# Patient Record
Sex: FEMALE | Race: BLACK OR AFRICAN AMERICAN | NOT HISPANIC OR LATINO | URBAN - METROPOLITAN AREA
[De-identification: names, ages, dates, MRNs, and addresses within clinical notes are randomized per-mention and may not be internally consistent; named-entity substitution may affect disease eponyms.]

---

## 2019-03-28 ENCOUNTER — OUTPATIENT (OUTPATIENT)
Dept: OUTPATIENT SERVICES | Facility: HOSPITAL | Age: 34
LOS: 1 days | Discharge: STILL ADMITTED OR ADMINISTRATIVE | End: 2019-03-28

## 2019-03-28 ENCOUNTER — INPATIENT (INPATIENT)
Facility: HOSPITAL | Age: 34
LOS: 7 days | Discharge: HOME | End: 2019-04-05
Attending: PSYCHIATRY & NEUROLOGY | Admitting: PSYCHIATRY & NEUROLOGY
Payer: COMMERCIAL

## 2019-03-28 VITALS — WEIGHT: 117.07 LBS | HEIGHT: 64 IN

## 2019-03-28 DIAGNOSIS — F29 UNSPECIFIED PSYCHOSIS NOT DUE TO A SUBSTANCE OR KNOWN PHYSIOLOGICAL CONDITION: ICD-10-CM

## 2019-03-28 PROCEDURE — 90792 PSYCH DIAG EVAL W/MED SRVCS: CPT

## 2019-03-28 RX ORDER — DIPHENHYDRAMINE HCL 50 MG
25 CAPSULE ORAL ONCE
Qty: 0 | Refills: 0 | Status: DISCONTINUED | OUTPATIENT
Start: 2019-03-28 | End: 2019-04-05

## 2019-03-28 RX ORDER — DIPHENHYDRAMINE HCL 50 MG
25 CAPSULE ORAL EVERY 6 HOURS
Qty: 0 | Refills: 0 | Status: DISCONTINUED | OUTPATIENT
Start: 2019-03-28 | End: 2019-04-05

## 2019-03-28 RX ORDER — HALOPERIDOL DECANOATE 100 MG/ML
5 INJECTION INTRAMUSCULAR ONCE
Qty: 0 | Refills: 0 | Status: DISCONTINUED | OUTPATIENT
Start: 2019-03-28 | End: 2019-04-05

## 2019-03-28 RX ORDER — HALOPERIDOL DECANOATE 100 MG/ML
5 INJECTION INTRAMUSCULAR EVERY 6 HOURS
Qty: 0 | Refills: 0 | Status: DISCONTINUED | OUTPATIENT
Start: 2019-03-28 | End: 2019-04-05

## 2019-03-28 RX ORDER — RISPERIDONE 4 MG/1
1 TABLET ORAL AT BEDTIME
Qty: 0 | Refills: 0 | Status: DISCONTINUED | OUTPATIENT
Start: 2019-03-28 | End: 2019-03-29

## 2019-03-28 RX ADMIN — RISPERIDONE 1 MILLIGRAM(S): 4 TABLET ORAL at 20:38

## 2019-03-28 NOTE — CHART NOTE - NSCHARTNOTEFT_GEN_A_CORE
Screening Medical Evaluation  Patient Admitted from: Crisis    [facility] admitting diagnosis: Non-organic psychosis    PAST MEDICAL & SURGICAL HISTORY:        Allergies    No Known Allergies    Intolerances        Social History:     FAMILY HISTORY:      MEDICATIONS  (STANDING):  risperiDONE   Tablet 1 milliGRAM(s) Oral at bedtime    MEDICATIONS  (PRN):  diphenhydrAMINE 25 milliGRAM(s) Oral every 6 hours PRN eps/psychotic agitation  diphenhydrAMINE   Injectable 25 milliGRAM(s) IntraMuscular once PRN eps/psychotic agitation  haloperidol     Tablet 5 milliGRAM(s) Oral every 6 hours PRN psychotic agitation  haloperidol    Injectable 5 milliGRAM(s) IntraMuscular once PRN psychotic agitation  LORazepam     Tablet 1 milliGRAM(s) Oral every 6 hours PRN anxiety/psychotic agitation  LORazepam   Injectable 1 milliGRAM(s) IntraMuscular once PRN psychotic agitation      Vital Signs Last 24 Hrs  T(C): --  T(F): --  HR: --91  BP: --141/89  BP(mean): --  RR: --  SpO2: --  CAPILLARY BLOOD GLUCOSE            PHYSICAL EXAM:  GENERAL: NAD, well-developed  HEAD:  Atraumatic, Normocephalic  EYES: EOMI, PERRLA, conjunctiva and sclera clear  NECK: Supple, No JVD  CHEST/LUNG: Clear to auscultation bilaterally; No wheeze  HEART: Regular rate and rhythm; No murmurs, rubs, or gallops  ABDOMEN: Soft, Nontender, Nondistended; Bowel sounds present  EXTREMITIES:  2+ Peripheral Pulses, No clubbing, cyanosis, or edema  PSYCH: AAOx3  NEUROLOGY: non-focal  SKIN: In tact    LABS:                    RADIOLOGY & ADDITIONAL TESTS:    Assessment and Plan: 33 yo F with no significant PMH is admitted to [facility] with a primary psychiatric diagnosis of Non-organic psychosis. The pt currently denies having any medical complaints such as chest pain, sob, abdominal pain, n/v/d/c, or any problems with urination or bowel movements. The rest of her screening physical is unremarkable.    1.Non-organic psychosis-Plan: continue with meds as per primary psychiatric team   2. Nutritional consult-Pt reports being unable to swallow certain pills, but she is unable to specify any further. Nutritional eval pending.

## 2019-03-29 DIAGNOSIS — F29 UNSPECIFIED PSYCHOSIS NOT DUE TO A SUBSTANCE OR KNOWN PHYSIOLOGICAL CONDITION: ICD-10-CM

## 2019-03-29 PROCEDURE — 99232 SBSQ HOSP IP/OBS MODERATE 35: CPT | Mod: GC

## 2019-03-29 RX ORDER — RISPERIDONE 4 MG/1
3 TABLET ORAL AT BEDTIME
Qty: 0 | Refills: 0 | Status: DISCONTINUED | OUTPATIENT
Start: 2019-03-30 | End: 2019-03-30

## 2019-03-29 RX ORDER — RISPERIDONE 4 MG/1
2 TABLET ORAL AT BEDTIME
Qty: 0 | Refills: 0 | Status: COMPLETED | OUTPATIENT
Start: 2019-03-29 | End: 2019-03-29

## 2019-03-29 RX ADMIN — Medication 1 MILLIGRAM(S): at 10:10

## 2019-03-29 RX ADMIN — Medication 1 MILLIGRAM(S): at 03:57

## 2019-03-30 PROCEDURE — 99232 SBSQ HOSP IP/OBS MODERATE 35: CPT

## 2019-03-30 RX ORDER — RISPERIDONE 4 MG/1
3 TABLET ORAL AT BEDTIME
Qty: 0 | Refills: 0 | Status: DISCONTINUED | OUTPATIENT
Start: 2019-03-30 | End: 2019-03-30

## 2019-03-30 RX ORDER — RISPERIDONE 4 MG/1
2 TABLET ORAL AT BEDTIME
Qty: 0 | Refills: 0 | Status: DISCONTINUED | OUTPATIENT
Start: 2019-03-30 | End: 2019-04-01

## 2019-03-30 RX ADMIN — Medication 1 MILLIGRAM(S): at 19:35

## 2019-03-30 RX ADMIN — Medication 1 MILLIGRAM(S): at 12:26

## 2019-03-30 RX ADMIN — Medication 25 MILLIGRAM(S): at 19:35

## 2019-03-30 RX ADMIN — HALOPERIDOL DECANOATE 5 MILLIGRAM(S): 100 INJECTION INTRAMUSCULAR at 19:35

## 2019-03-30 RX ADMIN — Medication 1 MILLIGRAM(S): at 08:40

## 2019-03-31 PROCEDURE — 99232 SBSQ HOSP IP/OBS MODERATE 35: CPT

## 2019-03-31 RX ADMIN — Medication 1 MILLIGRAM(S): at 09:00

## 2019-03-31 RX ADMIN — Medication 1 MILLIGRAM(S): at 13:12

## 2019-03-31 RX ADMIN — Medication 1 MILLIGRAM(S): at 21:15

## 2019-03-31 RX ADMIN — RISPERIDONE 2 MILLIGRAM(S): 4 TABLET ORAL at 21:15

## 2019-04-01 LAB
ALBUMIN SERPL ELPH-MCNC: 4.5 G/DL — SIGNIFICANT CHANGE UP (ref 3.3–5)
ALP SERPL-CCNC: 53 U/L — SIGNIFICANT CHANGE UP (ref 40–120)
ALT FLD-CCNC: 15 U/L — SIGNIFICANT CHANGE UP (ref 4–33)
ANION GAP SERPL CALC-SCNC: 11 MMO/L — SIGNIFICANT CHANGE UP (ref 7–14)
APAP SERPL-MCNC: < 15 UG/ML — LOW (ref 15–25)
APPEARANCE UR: CLEAR — SIGNIFICANT CHANGE UP
AST SERPL-CCNC: 19 U/L — SIGNIFICANT CHANGE UP (ref 4–32)
BACTERIA # UR AUTO: SIGNIFICANT CHANGE UP
BASOPHILS # BLD AUTO: 0.03 K/UL — SIGNIFICANT CHANGE UP (ref 0–0.2)
BASOPHILS NFR BLD AUTO: 0.5 % — SIGNIFICANT CHANGE UP (ref 0–2)
BILIRUB SERPL-MCNC: 0.7 MG/DL — SIGNIFICANT CHANGE UP (ref 0.2–1.2)
BILIRUB UR-MCNC: NEGATIVE — SIGNIFICANT CHANGE UP
BLOOD UR QL VISUAL: NEGATIVE — SIGNIFICANT CHANGE UP
BUN SERPL-MCNC: 11 MG/DL — SIGNIFICANT CHANGE UP (ref 7–23)
CALCIUM SERPL-MCNC: 9.6 MG/DL — SIGNIFICANT CHANGE UP (ref 8.4–10.5)
CHLORIDE SERPL-SCNC: 105 MMOL/L — SIGNIFICANT CHANGE UP (ref 98–107)
CHOLEST SERPL-MCNC: 145 MG/DL — SIGNIFICANT CHANGE UP (ref 120–199)
CO2 SERPL-SCNC: 24 MMOL/L — SIGNIFICANT CHANGE UP (ref 22–31)
COLOR SPEC: YELLOW — SIGNIFICANT CHANGE UP
CREAT SERPL-MCNC: 0.73 MG/DL — SIGNIFICANT CHANGE UP (ref 0.5–1.3)
EOSINOPHIL # BLD AUTO: 0.09 K/UL — SIGNIFICANT CHANGE UP (ref 0–0.5)
EOSINOPHIL NFR BLD AUTO: 1.6 % — SIGNIFICANT CHANGE UP (ref 0–6)
ETHANOL BLD-MCNC: < 10 MG/DL — SIGNIFICANT CHANGE UP
GLUCOSE SERPL-MCNC: 89 MG/DL — SIGNIFICANT CHANGE UP (ref 70–99)
GLUCOSE UR-MCNC: NEGATIVE — SIGNIFICANT CHANGE UP
HBA1C BLD-MCNC: 4.6 % — SIGNIFICANT CHANGE UP (ref 4–5.6)
HCG UR-SCNC: NEGATIVE — SIGNIFICANT CHANGE UP
HCT VFR BLD CALC: 38.3 % — SIGNIFICANT CHANGE UP (ref 34.5–45)
HDLC SERPL-MCNC: 79 MG/DL — HIGH (ref 45–65)
HGB BLD-MCNC: 13 G/DL — SIGNIFICANT CHANGE UP (ref 11.5–15.5)
HYALINE CASTS # UR AUTO: SIGNIFICANT CHANGE UP
IMM GRANULOCYTES NFR BLD AUTO: 0.2 % — SIGNIFICANT CHANGE UP (ref 0–1.5)
KETONES UR-MCNC: NEGATIVE — SIGNIFICANT CHANGE UP
LEUKOCYTE ESTERASE UR-ACNC: NEGATIVE — SIGNIFICANT CHANGE UP
LIPID PNL WITH DIRECT LDL SERPL: 67 MG/DL — SIGNIFICANT CHANGE UP
LYMPHOCYTES # BLD AUTO: 1.9 K/UL — SIGNIFICANT CHANGE UP (ref 1–3.3)
LYMPHOCYTES # BLD AUTO: 33.2 % — SIGNIFICANT CHANGE UP (ref 13–44)
MCHC RBC-ENTMCNC: 28.4 PG — SIGNIFICANT CHANGE UP (ref 27–34)
MCHC RBC-ENTMCNC: 33.9 % — SIGNIFICANT CHANGE UP (ref 32–36)
MCV RBC AUTO: 83.8 FL — SIGNIFICANT CHANGE UP (ref 80–100)
MONOCYTES # BLD AUTO: 0.48 K/UL — SIGNIFICANT CHANGE UP (ref 0–0.9)
MONOCYTES NFR BLD AUTO: 8.4 % — SIGNIFICANT CHANGE UP (ref 2–14)
NEUTROPHILS # BLD AUTO: 3.21 K/UL — SIGNIFICANT CHANGE UP (ref 1.8–7.4)
NEUTROPHILS NFR BLD AUTO: 56.1 % — SIGNIFICANT CHANGE UP (ref 43–77)
NITRITE UR-MCNC: NEGATIVE — SIGNIFICANT CHANGE UP
NRBC # FLD: 0 K/UL — SIGNIFICANT CHANGE UP (ref 0–0)
PH UR: 6.5 — SIGNIFICANT CHANGE UP (ref 5–8)
PLATELET # BLD AUTO: 302 K/UL — SIGNIFICANT CHANGE UP (ref 150–400)
PMV BLD: 11 FL — SIGNIFICANT CHANGE UP (ref 7–13)
POTASSIUM SERPL-MCNC: 3.8 MMOL/L — SIGNIFICANT CHANGE UP (ref 3.5–5.3)
POTASSIUM SERPL-SCNC: 3.8 MMOL/L — SIGNIFICANT CHANGE UP (ref 3.5–5.3)
PROT SERPL-MCNC: 7.6 G/DL — SIGNIFICANT CHANGE UP (ref 6–8.3)
PROT UR-MCNC: 20 — SIGNIFICANT CHANGE UP
RBC # BLD: 4.57 M/UL — SIGNIFICANT CHANGE UP (ref 3.8–5.2)
RBC # FLD: 12.8 % — SIGNIFICANT CHANGE UP (ref 10.3–14.5)
RBC CASTS # UR COMP ASSIST: SIGNIFICANT CHANGE UP (ref 0–?)
SALICYLATES SERPL-MCNC: < 5 MG/DL — LOW (ref 15–30)
SODIUM SERPL-SCNC: 140 MMOL/L — SIGNIFICANT CHANGE UP (ref 135–145)
SP GR SPEC: 1.03 — SIGNIFICANT CHANGE UP (ref 1–1.04)
SP GR UR: 1.04 — HIGH (ref 1–1.03)
SQUAMOUS # UR AUTO: SIGNIFICANT CHANGE UP
TRIGL SERPL-MCNC: 41 MG/DL — SIGNIFICANT CHANGE UP (ref 10–149)
TSH SERPL-MCNC: 1.29 UIU/ML — SIGNIFICANT CHANGE UP (ref 0.27–4.2)
UROBILINOGEN FLD QL: SIGNIFICANT CHANGE UP
WBC # BLD: 5.72 K/UL — SIGNIFICANT CHANGE UP (ref 3.8–10.5)
WBC # FLD AUTO: 5.72 K/UL — SIGNIFICANT CHANGE UP (ref 3.8–10.5)
WBC UR QL: HIGH (ref 0–?)

## 2019-04-01 PROCEDURE — 99232 SBSQ HOSP IP/OBS MODERATE 35: CPT | Mod: GC

## 2019-04-01 RX ORDER — RISPERIDONE 4 MG/1
3 TABLET ORAL AT BEDTIME
Qty: 0 | Refills: 0 | Status: DISCONTINUED | OUTPATIENT
Start: 2019-04-01 | End: 2019-04-04

## 2019-04-01 RX ADMIN — Medication 1 MILLIGRAM(S): at 21:25

## 2019-04-01 RX ADMIN — Medication 1 MILLIGRAM(S): at 13:13

## 2019-04-01 RX ADMIN — Medication 1 MILLIGRAM(S): at 08:47

## 2019-04-01 RX ADMIN — RISPERIDONE 3 MILLIGRAM(S): 4 TABLET ORAL at 21:25

## 2019-04-02 PROCEDURE — 99232 SBSQ HOSP IP/OBS MODERATE 35: CPT | Mod: GC

## 2019-04-02 RX ADMIN — Medication 1 MILLIGRAM(S): at 22:07

## 2019-04-02 RX ADMIN — Medication 1 MILLIGRAM(S): at 13:13

## 2019-04-02 RX ADMIN — RISPERIDONE 3 MILLIGRAM(S): 4 TABLET ORAL at 22:06

## 2019-04-02 RX ADMIN — Medication 1 MILLIGRAM(S): at 08:45

## 2019-04-03 PROCEDURE — 99232 SBSQ HOSP IP/OBS MODERATE 35: CPT

## 2019-04-03 RX ADMIN — RISPERIDONE 3 MILLIGRAM(S): 4 TABLET ORAL at 21:29

## 2019-04-03 RX ADMIN — Medication 1 MILLIGRAM(S): at 21:29

## 2019-04-03 RX ADMIN — Medication 1 MILLIGRAM(S): at 13:30

## 2019-04-03 RX ADMIN — Medication 1 MILLIGRAM(S): at 08:53

## 2019-04-04 RX ORDER — RISPERIDONE 4 MG/1
4 TABLET ORAL AT BEDTIME
Qty: 0 | Refills: 0 | Status: DISCONTINUED | OUTPATIENT
Start: 2019-04-04 | End: 2019-04-05

## 2019-04-04 RX ADMIN — Medication 1 MILLIGRAM(S): at 09:50

## 2019-04-04 RX ADMIN — Medication 1 MILLIGRAM(S): at 21:38

## 2019-04-04 RX ADMIN — Medication 1 MILLIGRAM(S): at 12:52

## 2019-04-04 RX ADMIN — RISPERIDONE 4 MILLIGRAM(S): 4 TABLET ORAL at 21:38

## 2019-04-05 VITALS — DIASTOLIC BLOOD PRESSURE: 65 MMHG | SYSTOLIC BLOOD PRESSURE: 100 MMHG | RESPIRATION RATE: 17 BRPM | HEART RATE: 94 BPM

## 2019-04-05 PROCEDURE — 99232 SBSQ HOSP IP/OBS MODERATE 35: CPT | Mod: GC

## 2019-04-05 RX ORDER — RISPERIDONE 4 MG/1
1 TABLET ORAL
Qty: 14 | Refills: 0
Start: 2019-04-05 | End: 2019-04-18

## 2019-04-05 RX ADMIN — Medication 1 MILLIGRAM(S): at 09:45

## 2019-04-05 RX ADMIN — Medication 1 MILLIGRAM(S): at 14:14

## 2019-04-08 ENCOUNTER — OUTPATIENT (OUTPATIENT)
Dept: OUTPATIENT SERVICES | Facility: HOSPITAL | Age: 34
LOS: 1 days | Discharge: HOME | End: 2019-04-08

## 2019-04-09 DIAGNOSIS — F29 UNSPECIFIED PSYCHOSIS NOT DUE TO A SUBSTANCE OR KNOWN PHYSIOLOGICAL CONDITION: ICD-10-CM

## 2019-05-01 LAB
ALBUMIN SERPL ELPH-MCNC: 4 G/DL — SIGNIFICANT CHANGE UP (ref 3.3–5)
ALP SERPL-CCNC: 57 U/L — SIGNIFICANT CHANGE UP (ref 40–120)
ALT FLD-CCNC: 18 U/L — SIGNIFICANT CHANGE UP (ref 4–33)
ANION GAP SERPL CALC-SCNC: 12 MMO/L — SIGNIFICANT CHANGE UP (ref 7–14)
AST SERPL-CCNC: 19 U/L — SIGNIFICANT CHANGE UP (ref 4–32)
BASOPHILS # BLD AUTO: 0.02 K/UL — SIGNIFICANT CHANGE UP (ref 0–0.2)
BASOPHILS NFR BLD AUTO: 0.4 % — SIGNIFICANT CHANGE UP (ref 0–2)
BILIRUB SERPL-MCNC: 0.3 MG/DL — SIGNIFICANT CHANGE UP (ref 0.2–1.2)
BUN SERPL-MCNC: 7 MG/DL — SIGNIFICANT CHANGE UP (ref 7–23)
CALCIUM SERPL-MCNC: 9.2 MG/DL — SIGNIFICANT CHANGE UP (ref 8.4–10.5)
CHLORIDE SERPL-SCNC: 99 MMOL/L — SIGNIFICANT CHANGE UP (ref 98–107)
CO2 SERPL-SCNC: 26 MMOL/L — SIGNIFICANT CHANGE UP (ref 22–31)
CREAT SERPL-MCNC: 0.64 MG/DL — SIGNIFICANT CHANGE UP (ref 0.5–1.3)
EOSINOPHIL # BLD AUTO: 0.12 K/UL — SIGNIFICANT CHANGE UP (ref 0–0.5)
EOSINOPHIL NFR BLD AUTO: 2.2 % — SIGNIFICANT CHANGE UP (ref 0–6)
GLUCOSE SERPL-MCNC: 73 MG/DL — SIGNIFICANT CHANGE UP (ref 70–99)
HCT VFR BLD CALC: 34.5 % — SIGNIFICANT CHANGE UP (ref 34.5–45)
HGB BLD-MCNC: 11.6 G/DL — SIGNIFICANT CHANGE UP (ref 11.5–15.5)
IMM GRANULOCYTES NFR BLD AUTO: 0.2 % — SIGNIFICANT CHANGE UP (ref 0–1.5)
LYMPHOCYTES # BLD AUTO: 1.56 K/UL — SIGNIFICANT CHANGE UP (ref 1–3.3)
LYMPHOCYTES # BLD AUTO: 28.8 % — SIGNIFICANT CHANGE UP (ref 13–44)
MCHC RBC-ENTMCNC: 28.4 PG — SIGNIFICANT CHANGE UP (ref 27–34)
MCHC RBC-ENTMCNC: 33.6 % — SIGNIFICANT CHANGE UP (ref 32–36)
MCV RBC AUTO: 84.6 FL — SIGNIFICANT CHANGE UP (ref 80–100)
MONOCYTES # BLD AUTO: 0.5 K/UL — SIGNIFICANT CHANGE UP (ref 0–0.9)
MONOCYTES NFR BLD AUTO: 9.2 % — SIGNIFICANT CHANGE UP (ref 2–14)
NEUTROPHILS # BLD AUTO: 3.21 K/UL — SIGNIFICANT CHANGE UP (ref 1.8–7.4)
NEUTROPHILS NFR BLD AUTO: 59.2 % — SIGNIFICANT CHANGE UP (ref 43–77)
NRBC # FLD: 0.06 K/UL — SIGNIFICANT CHANGE UP (ref 0–0)
NRBC FLD-RTO: 1.1 — SIGNIFICANT CHANGE UP
PLATELET # BLD AUTO: 254 K/UL — SIGNIFICANT CHANGE UP (ref 150–400)
PMV BLD: 11.1 FL — SIGNIFICANT CHANGE UP (ref 7–13)
POTASSIUM SERPL-MCNC: 3.8 MMOL/L — SIGNIFICANT CHANGE UP (ref 3.5–5.3)
POTASSIUM SERPL-SCNC: 3.8 MMOL/L — SIGNIFICANT CHANGE UP (ref 3.5–5.3)
PROT SERPL-MCNC: 6.8 G/DL — SIGNIFICANT CHANGE UP (ref 6–8.3)
RBC # BLD: 4.08 M/UL — SIGNIFICANT CHANGE UP (ref 3.8–5.2)
RBC # FLD: 12.8 % — SIGNIFICANT CHANGE UP (ref 10.3–14.5)
SODIUM SERPL-SCNC: 137 MMOL/L — SIGNIFICANT CHANGE UP (ref 135–145)
WBC # BLD: 5.42 K/UL — SIGNIFICANT CHANGE UP (ref 3.8–10.5)
WBC # FLD AUTO: 5.42 K/UL — SIGNIFICANT CHANGE UP (ref 3.8–10.5)

## 2019-05-02 LAB — PROLACTIN SERPL-MCNC: 338 NG/ML — HIGH (ref 3.4–24.1)

## 2019-05-17 LAB
ALBUMIN SERPL ELPH-MCNC: 4.5 G/DL — SIGNIFICANT CHANGE UP (ref 3.3–5)
ALP SERPL-CCNC: 66 U/L — SIGNIFICANT CHANGE UP (ref 40–120)
ALT FLD-CCNC: 19 U/L — SIGNIFICANT CHANGE UP (ref 4–33)
ANION GAP SERPL CALC-SCNC: 12 MMO/L — SIGNIFICANT CHANGE UP (ref 7–14)
AST SERPL-CCNC: 20 U/L — SIGNIFICANT CHANGE UP (ref 4–32)
BASOPHILS # BLD AUTO: 0.01 K/UL — SIGNIFICANT CHANGE UP (ref 0–0.2)
BASOPHILS NFR BLD AUTO: 0.1 % — SIGNIFICANT CHANGE UP (ref 0–2)
BILIRUB SERPL-MCNC: 0.3 MG/DL — SIGNIFICANT CHANGE UP (ref 0.2–1.2)
BUN SERPL-MCNC: 10 MG/DL — SIGNIFICANT CHANGE UP (ref 7–23)
CALCIUM SERPL-MCNC: 9.5 MG/DL — SIGNIFICANT CHANGE UP (ref 8.4–10.5)
CHLORIDE SERPL-SCNC: 108 MMOL/L — HIGH (ref 98–107)
CO2 SERPL-SCNC: 25 MMOL/L — SIGNIFICANT CHANGE UP (ref 22–31)
CREAT SERPL-MCNC: 0.67 MG/DL — SIGNIFICANT CHANGE UP (ref 0.5–1.3)
EOSINOPHIL # BLD AUTO: 0.15 K/UL — SIGNIFICANT CHANGE UP (ref 0–0.5)
EOSINOPHIL NFR BLD AUTO: 1.7 % — SIGNIFICANT CHANGE UP (ref 0–6)
GLUCOSE SERPL-MCNC: 55 MG/DL — LOW (ref 70–99)
HCT VFR BLD CALC: 34.3 % — LOW (ref 34.5–45)
HGB BLD-MCNC: 11.7 G/DL — SIGNIFICANT CHANGE UP (ref 11.5–15.5)
IMM GRANULOCYTES NFR BLD AUTO: 0.2 % — SIGNIFICANT CHANGE UP (ref 0–1.5)
LYMPHOCYTES # BLD AUTO: 1.9 K/UL — SIGNIFICANT CHANGE UP (ref 1–3.3)
LYMPHOCYTES # BLD AUTO: 22 % — SIGNIFICANT CHANGE UP (ref 13–44)
MCHC RBC-ENTMCNC: 28.6 PG — SIGNIFICANT CHANGE UP (ref 27–34)
MCHC RBC-ENTMCNC: 34.1 % — SIGNIFICANT CHANGE UP (ref 32–36)
MCV RBC AUTO: 83.9 FL — SIGNIFICANT CHANGE UP (ref 80–100)
MONOCYTES # BLD AUTO: 0.57 K/UL — SIGNIFICANT CHANGE UP (ref 0–0.9)
MONOCYTES NFR BLD AUTO: 6.6 % — SIGNIFICANT CHANGE UP (ref 2–14)
NEUTROPHILS # BLD AUTO: 6 K/UL — SIGNIFICANT CHANGE UP (ref 1.8–7.4)
NEUTROPHILS NFR BLD AUTO: 69.4 % — SIGNIFICANT CHANGE UP (ref 43–77)
NRBC # FLD: 0 K/UL — SIGNIFICANT CHANGE UP (ref 0–0)
PLATELET # BLD AUTO: 279 K/UL — SIGNIFICANT CHANGE UP (ref 150–400)
PMV BLD: 10.7 FL — SIGNIFICANT CHANGE UP (ref 7–13)
POTASSIUM SERPL-MCNC: 3.8 MMOL/L — SIGNIFICANT CHANGE UP (ref 3.5–5.3)
POTASSIUM SERPL-SCNC: 3.8 MMOL/L — SIGNIFICANT CHANGE UP (ref 3.5–5.3)
PROLACTIN SERPL-MCNC: 57.9 NG/ML — HIGH (ref 3.4–24.1)
PROT SERPL-MCNC: 7.5 G/DL — SIGNIFICANT CHANGE UP (ref 6–8.3)
RBC # BLD: 4.09 M/UL — SIGNIFICANT CHANGE UP (ref 3.8–5.2)
RBC # FLD: 12.9 % — SIGNIFICANT CHANGE UP (ref 10.3–14.5)
SODIUM SERPL-SCNC: 145 MMOL/L — SIGNIFICANT CHANGE UP (ref 135–145)
WBC # BLD: 8.65 K/UL — SIGNIFICANT CHANGE UP (ref 3.8–10.5)
WBC # FLD AUTO: 8.65 K/UL — SIGNIFICANT CHANGE UP (ref 3.8–10.5)

## 2019-06-08 ENCOUNTER — EMERGENCY (EMERGENCY)
Facility: HOSPITAL | Age: 34
LOS: 1 days | Discharge: HOME | End: 2019-06-08
Attending: EMERGENCY MEDICINE | Admitting: EMERGENCY MEDICINE
Payer: COMMERCIAL

## 2019-06-08 VITALS
TEMPERATURE: 99 F | HEART RATE: 76 BPM | RESPIRATION RATE: 16 BRPM | OXYGEN SATURATION: 100 % | DIASTOLIC BLOOD PRESSURE: 67 MMHG | SYSTOLIC BLOOD PRESSURE: 113 MMHG

## 2019-06-08 PROCEDURE — 99281 EMR DPT VST MAYX REQ PHY/QHP: CPT

## 2019-06-08 RX ORDER — SERTRALINE 25 MG/1
1 TABLET, FILM COATED ORAL
Qty: 3 | Refills: 0
Start: 2019-06-08 | End: 2019-06-10

## 2019-06-08 NOTE — ED PROVIDER NOTE - OBJECTIVE STATEMENT
Cabot: 34F with PMH of depression and anxiety comes in for a refill of her psych medication, sertraline, to get her through the weekend until she can speak with her doctor.  No SI, HI, hallucinations.  She provided the bottle for confirmation of dose.

## 2019-06-08 NOTE — ED PROVIDER NOTE - CLINICAL SUMMARY MEDICAL DECISION MAKING FREE TEXT BOX
Cabot: 34F with PMH of depression and anxiety comes in for a refill of her psych medication, sertraline, to get her through the weekend until she can speak with her doctor.  No SI, HI, hallucinations.  She provided the bottle for confirmation of dose. Normal exam.  Ordered medication for the weekend and Monday.

## 2019-06-08 NOTE — ED PROVIDER NOTE - PHYSICAL EXAMINATION
HDS, NAD, MMM, eyes clear, lungs CTAB, heart sounds normal, LEs without edema, wwp, skin normal temperature and color, neuro: alert and oriented, no focal deficits

## 2019-06-19 ENCOUNTER — OUTPATIENT (OUTPATIENT)
Dept: OUTPATIENT SERVICES | Facility: HOSPITAL | Age: 34
LOS: 1 days | Discharge: HOME | End: 2019-06-19

## 2019-06-19 ENCOUNTER — RESULT REVIEW (OUTPATIENT)
Age: 34
End: 2019-06-19

## 2019-07-05 DIAGNOSIS — F20.9 SCHIZOPHRENIA, UNSPECIFIED: ICD-10-CM

## 2019-12-23 ENCOUNTER — APPOINTMENT (OUTPATIENT)
Dept: ANTEPARTUM | Facility: CLINIC | Age: 34
End: 2019-12-23

## 2019-12-27 ENCOUNTER — ASOB RESULT (OUTPATIENT)
Age: 34
End: 2019-12-27

## 2019-12-27 ENCOUNTER — APPOINTMENT (OUTPATIENT)
Dept: ANTEPARTUM | Facility: CLINIC | Age: 34
End: 2019-12-27
Payer: COMMERCIAL

## 2019-12-27 PROCEDURE — 76813 OB US NUCHAL MEAS 1 GEST: CPT

## 2019-12-27 PROCEDURE — 36416 COLLJ CAPILLARY BLOOD SPEC: CPT

## 2019-12-27 PROCEDURE — 76801 OB US < 14 WKS SINGLE FETUS: CPT

## 2020-01-02 ENCOUNTER — LABORATORY RESULT (OUTPATIENT)
Age: 35
End: 2020-01-02

## 2020-01-02 ENCOUNTER — APPOINTMENT (OUTPATIENT)
Dept: PEDIATRIC MEDICAL GENETICS | Facility: CLINIC | Age: 35
End: 2020-01-02
Payer: COMMERCIAL

## 2020-01-02 PROCEDURE — 99202 OFFICE O/P NEW SF 15 MIN: CPT

## 2020-01-15 NOTE — ADDENDUM
[FreeTextEntry1] : January 10, 2020  NIPS (Verifi) was reported as negative for Down syndrome, Trisomy 18/13, and five deletion syndromes.  Patient will be notified. and results will be faxed to your office.

## 2020-02-14 ENCOUNTER — ASOB RESULT (OUTPATIENT)
Age: 35
End: 2020-02-14

## 2020-02-14 ENCOUNTER — APPOINTMENT (OUTPATIENT)
Dept: ANTEPARTUM | Facility: CLINIC | Age: 35
End: 2020-02-14
Payer: COMMERCIAL

## 2020-02-14 PROCEDURE — 76817 TRANSVAGINAL US OBSTETRIC: CPT

## 2020-02-14 PROCEDURE — 76811 OB US DETAILED SNGL FETUS: CPT

## 2020-03-11 ENCOUNTER — OUTPATIENT (OUTPATIENT)
Dept: INPATIENT UNIT | Facility: HOSPITAL | Age: 35
LOS: 1 days | Discharge: HOME | End: 2020-03-11
Payer: COMMERCIAL

## 2020-03-11 VITALS
RESPIRATION RATE: 17 BRPM | HEART RATE: 76 BPM | TEMPERATURE: 98 F | DIASTOLIC BLOOD PRESSURE: 63 MMHG | SYSTOLIC BLOOD PRESSURE: 111 MMHG

## 2020-03-11 VITALS — SYSTOLIC BLOOD PRESSURE: 109 MMHG | DIASTOLIC BLOOD PRESSURE: 68 MMHG | HEART RATE: 71 BPM

## 2020-03-11 DIAGNOSIS — Z3A.00 WEEKS OF GESTATION OF PREGNANCY NOT SPECIFIED: ICD-10-CM

## 2020-03-11 DIAGNOSIS — O26.899 OTHER SPECIFIED PREGNANCY RELATED CONDITIONS, UNSPECIFIED TRIMESTER: ICD-10-CM

## 2020-03-11 LAB
APPEARANCE UR: CLEAR — SIGNIFICANT CHANGE UP
BACTERIA # UR AUTO: HIGH
BILIRUB UR-MCNC: NEGATIVE — SIGNIFICANT CHANGE UP
BLOOD UR QL VISUAL: NEGATIVE — SIGNIFICANT CHANGE UP
COD CRY URNS QL: SIGNIFICANT CHANGE UP (ref 0–0)
COLOR SPEC: YELLOW — SIGNIFICANT CHANGE UP
GLUCOSE UR-MCNC: NEGATIVE — SIGNIFICANT CHANGE UP
HYALINE CASTS # UR AUTO: SIGNIFICANT CHANGE UP
KETONES UR-MCNC: NEGATIVE — SIGNIFICANT CHANGE UP
LEUKOCYTE ESTERASE UR-ACNC: NEGATIVE — SIGNIFICANT CHANGE UP
MUCOUS THREADS # UR AUTO: SIGNIFICANT CHANGE UP
NITRITE UR-MCNC: NEGATIVE — SIGNIFICANT CHANGE UP
PH UR: 6.5 — SIGNIFICANT CHANGE UP (ref 5–8)
PROT UR-MCNC: 30 — SIGNIFICANT CHANGE UP
RBC CASTS # UR COMP ASSIST: SIGNIFICANT CHANGE UP (ref 0–?)
SP GR SPEC: 1.03 — SIGNIFICANT CHANGE UP (ref 1–1.04)
SQUAMOUS # UR AUTO: SIGNIFICANT CHANGE UP
UROBILINOGEN FLD QL: SIGNIFICANT CHANGE UP
WBC UR QL: HIGH (ref 0–?)

## 2020-03-11 PROCEDURE — 76830 TRANSVAGINAL US NON-OB: CPT | Mod: 26

## 2020-03-11 PROCEDURE — 59025 FETAL NON-STRESS TEST: CPT | Mod: 26

## 2020-03-11 PROCEDURE — 99213 OFFICE O/P EST LOW 20 MIN: CPT | Mod: 25

## 2020-03-11 RX ORDER — SERTRALINE 25 MG/1
1 TABLET, FILM COATED ORAL
Qty: 0 | Refills: 0 | DISCHARGE

## 2020-03-11 RX ORDER — OLANZAPINE 15 MG/1
1 TABLET, FILM COATED ORAL
Qty: 0 | Refills: 0 | DISCHARGE

## 2020-03-11 NOTE — OB PROVIDER TRIAGE NOTE - HISTORY OF PRESENT ILLNESS
34 yo Black female @ 23.6 wks GA   presents after Routine prenatal  visit today with c/o vaginal spotting after using the bathroom since  when wiping. Patient also c/o mild lower back pain intermittently Denies any active VB, LOF, and reports good FM's  PNC: Dr Pacheco  Patient HX schizophrenia and is seen at [facility] weekly on Zoloft and Olanzapine daily   Patient's Blood Type = A Positive

## 2020-03-11 NOTE — OB PROVIDER TRIAGE NOTE - NSHPPHYSICALEXAM_GEN_ALL_CORE
A/O x 3  NAD  Vital Signs Last 24 Hrs  T(C): 37.1 (11 Mar 2020 12:29), Max: 37.1 (11 Mar 2020 12:29)  T(F): 98.78 (11 Mar 2020 12:29), Max: 98.78 (11 Mar 2020 12:29)  HR: 71 (11 Mar 2020 14:13) (71 - 79)  BP: 109/68 (11 Mar 2020 14:13) (109/68 - 115/59)  BP(mean): --  RR: 16 (11 Mar 2020 12:29) (16 - 17)  SpO2: --  Abdomen is soft NT and gravid with no pain replicable at this time  SSE: No pooling, no evidence of vaginal bleeding in the vault, cervix appears closed visually  Transvaginal ultrasound at the bedside , images printed and placed in patients shadow chart = 5.0 cm  FHR: Cat 1 for GA (140 baseline, accels, no decels)  TOCO: NO CTX

## 2020-03-11 NOTE — OB RN TRIAGE NOTE - MENTAL HEALTH CONDITIONS/SYMPTOMS, PROFILE
depression schizophrenia/depression depression/car @ [place]- sees psychologist q2 weeks, psychiatrist qmonth/schizophrenia

## 2020-03-11 NOTE — OB PROVIDER TRIAGE NOTE - NSOBPROVIDERNOTE_OBGYN_ALL_OB_FT
34 yo @ 23.6 wks GA with no evidence of vaginal bleeding or PTL at this time  Plan for discharge home and OB F/U as scheduled  DW Dr Vivas (OB Attending Covering)

## 2020-03-11 NOTE — OB PROVIDER TRIAGE NOTE - NSHPLABSRESULTS_GEN_ALL_CORE
U/A U/A  Urinalysis Basic - ( 11 Mar 2020 13:28 )    Color: YELLOW / Appearance: CLEAR / S.033 / pH: 6.5  Gluc: NEGATIVE / Ketone: NEGATIVE  / Bili: NEGATIVE / Urobili: TRACE   Blood: NEGATIVE / Protein: 30 / Nitrite: NEGATIVE   Leuk Esterase: NEGATIVE / RBC: 3-5 / WBC 6-10   Sq Epi: SMALL / Non Sq Epi: x / Bacteria: MODERATE

## 2020-06-12 PROBLEM — Z00.00 ENCOUNTER FOR PREVENTIVE HEALTH EXAMINATION: Noted: 2020-06-12

## 2020-06-15 ENCOUNTER — OUTPATIENT (OUTPATIENT)
Dept: OUTPATIENT SERVICES | Facility: HOSPITAL | Age: 35
LOS: 1 days | End: 2020-06-15

## 2020-06-15 VITALS
SYSTOLIC BLOOD PRESSURE: 108 MMHG | HEART RATE: 88 BPM | DIASTOLIC BLOOD PRESSURE: 70 MMHG | OXYGEN SATURATION: 98 % | TEMPERATURE: 98 F | RESPIRATION RATE: 16 BRPM

## 2020-06-15 DIAGNOSIS — Z34.90 ENCOUNTER FOR SUPERVISION OF NORMAL PREGNANCY, UNSPECIFIED, UNSPECIFIED TRIMESTER: ICD-10-CM

## 2020-06-15 LAB
APPEARANCE UR: CLEAR — SIGNIFICANT CHANGE UP
BACTERIA # UR AUTO: HIGH
BILIRUB UR-MCNC: NEGATIVE — SIGNIFICANT CHANGE UP
BLD GP AB SCN SERPL QL: NEGATIVE — SIGNIFICANT CHANGE UP
BLOOD UR QL VISUAL: NEGATIVE — SIGNIFICANT CHANGE UP
COLOR SPEC: SIGNIFICANT CHANGE UP
GLUCOSE UR-MCNC: NEGATIVE — SIGNIFICANT CHANGE UP
HCT VFR BLD CALC: 30.2 % — LOW (ref 34.5–45)
HGB BLD-MCNC: 10.6 G/DL — LOW (ref 11.5–15.5)
HYALINE CASTS # UR AUTO: NEGATIVE — SIGNIFICANT CHANGE UP
KETONES UR-MCNC: NEGATIVE — SIGNIFICANT CHANGE UP
LEUKOCYTE ESTERASE UR-ACNC: SIGNIFICANT CHANGE UP
MCHC RBC-ENTMCNC: 29.4 PG — SIGNIFICANT CHANGE UP (ref 27–34)
MCHC RBC-ENTMCNC: 35.1 % — SIGNIFICANT CHANGE UP (ref 32–36)
MCV RBC AUTO: 83.7 FL — SIGNIFICANT CHANGE UP (ref 80–100)
NITRITE UR-MCNC: NEGATIVE — SIGNIFICANT CHANGE UP
NRBC # FLD: 0 K/UL — SIGNIFICANT CHANGE UP (ref 0–0)
PH UR: 7.5 — SIGNIFICANT CHANGE UP (ref 5–8)
PLATELET # BLD AUTO: 254 K/UL — SIGNIFICANT CHANGE UP (ref 150–400)
PMV BLD: 11 FL — SIGNIFICANT CHANGE UP (ref 7–13)
PROT UR-MCNC: NEGATIVE — SIGNIFICANT CHANGE UP
RBC # BLD: 3.61 M/UL — LOW (ref 3.8–5.2)
RBC # FLD: 14.1 % — SIGNIFICANT CHANGE UP (ref 10.3–14.5)
RBC CASTS # UR COMP ASSIST: SIGNIFICANT CHANGE UP (ref 0–?)
RH IG SCN BLD-IMP: POSITIVE — SIGNIFICANT CHANGE UP
SP GR SPEC: 1.01 — SIGNIFICANT CHANGE UP (ref 1–1.04)
SQUAMOUS # UR AUTO: SIGNIFICANT CHANGE UP
UROBILINOGEN FLD QL: NORMAL — SIGNIFICANT CHANGE UP
WBC # BLD: 10.68 K/UL — HIGH (ref 3.8–10.5)
WBC # FLD AUTO: 10.68 K/UL — HIGH (ref 3.8–10.5)
WBC UR QL: HIGH (ref 0–?)

## 2020-06-15 NOTE — OB PST NOTE - NSHPREVIEWOFSYSTEMS_GEN_ALL_CORE
General: No fever - No COVID as per patient   Skin: No rashes, itching,   Breast: Normal   Ophthalmologic: No diplopia, photophobia, lacrimation, blurred Vision   ENMT Symptoms: No hearing difficulty    Respiratory and Thorax: No wheezing, dyspnea, cough,   Cardiovascular: No chest pain, palpitations, dyspnea on exertion,     Gastrointestinal: No nausea, vomiting, diarrhea, constipation,   Genitourinary/ Pelvis: No hematuria, renal colic, or flank pain. Pt denies vaginal bleeding or contractions in PST    Musculoskeletal: No arthralgia, arthritis  Neurological: No transient paralysis, weakness, paresthesias, or seizures.     Psychiatric: No suicidal ideation, depression, anxiety,   Hematology: No gum bleeding, nose bleeding, or skin lumps    Lymphatic: No enlarged or tender lymph nodes.   Endocrine: No heat or cold intolerance    Immunologic: No recurrent or persistent infections

## 2020-06-15 NOTE — OB PST NOTE - NSHPPHYSICALEXAM_GEN_ALL_CORE
Constitutional: Well Developed, Well Groomed, Well Nourished, No Distress    Eyes: PERRL, EOMI, conjunctiva clear    Ears: Normal    Mouth & Gums: Normal, moist  Neck: Supple, no JVD,   Breast: Normal shape,   Back: Normal shape, ROM intact, strength intact,   Respiratory: Airway patent, breath sounds equal, good air movement, respiration non-labored, clear to auscultation bilateral,     Cardiovascular:  Regular rate and rhythm,   Gastrointestinal: Normal   Extremities: No clubbing, cyanosis, or pedal edema    Vascular:  Carotid Pulse normal   Neurological: alert & oriented x 3,   Skin: warm and dry, normal color    Lymph Nodes: normal posterior cervical lymph node, normal anterior cervical lymph node, normal supraclavicular lymph node,     Musculoskeletal: ROM intact, Normal strength    Psychiatric: normal affect, normal behavior

## 2020-06-15 NOTE — OB PST NOTE - PROBLEM SELECTOR PLAN 1
Pt scheduled for surgery and preop instructions including instructions for and for Chlorhexidine use in showering on the day of surgery, given verbally and with use of  written materials, and patient confirming understanding of such instructions using  teach back method.  Repeat T&S ordered

## 2020-06-15 NOTE — OB PST NOTE - HISTORY OF PRESENT ILLNESS
Pt is a Pt is a 35 yr old female scheduled for  for Breech presentation with Dr Hale tentatively 20 - pt Gestation 37 weeks,  - pt had  2016 at 40 weeks. Pt denies complications this pregnancy . Pt denies GDM or vaginal bleeding or contractions today. Pt seen by OB 20 - PATRICK 20.   Pt told of need for COVID testing - to be arranged by OB

## 2020-06-16 ENCOUNTER — OUTPATIENT (OUTPATIENT)
Dept: OUTPATIENT SERVICES | Facility: HOSPITAL | Age: 35
LOS: 1 days | Discharge: HOME | End: 2020-06-16
Payer: COMMERCIAL

## 2020-06-16 ENCOUNTER — ASOB RESULT (OUTPATIENT)
Age: 35
End: 2020-06-16

## 2020-06-16 ENCOUNTER — APPOINTMENT (OUTPATIENT)
Dept: ANTEPARTUM | Facility: HOSPITAL | Age: 35
End: 2020-06-16

## 2020-06-16 ENCOUNTER — APPOINTMENT (OUTPATIENT)
Dept: ANTEPARTUM | Facility: HOSPITAL | Age: 35
End: 2020-06-16
Payer: COMMERCIAL

## 2020-06-16 VITALS — HEART RATE: 88 BPM | DIASTOLIC BLOOD PRESSURE: 60 MMHG | SYSTOLIC BLOOD PRESSURE: 91 MMHG

## 2020-06-16 VITALS — TEMPERATURE: 98 F | RESPIRATION RATE: 17 BRPM

## 2020-06-16 DIAGNOSIS — Z3A.00 WEEKS OF GESTATION OF PREGNANCY NOT SPECIFIED: ICD-10-CM

## 2020-06-16 DIAGNOSIS — O26.899 OTHER SPECIFIED PREGNANCY RELATED CONDITIONS, UNSPECIFIED TRIMESTER: ICD-10-CM

## 2020-06-16 LAB — T PALLIDUM AB TITR SER: NEGATIVE — SIGNIFICANT CHANGE UP

## 2020-06-16 PROCEDURE — 76805 OB US >/= 14 WKS SNGL FETUS: CPT

## 2020-06-16 PROCEDURE — 59412 ANTEPARTUM MANIPULATION: CPT

## 2020-06-16 PROCEDURE — 76820 UMBILICAL ARTERY ECHO: CPT

## 2020-06-16 PROCEDURE — 99213 OFFICE O/P EST LOW 20 MIN: CPT

## 2020-06-16 PROCEDURE — 76818 FETAL BIOPHYS PROFILE W/NST: CPT | Mod: 26

## 2020-06-16 PROCEDURE — 59025 FETAL NON-STRESS TEST: CPT | Mod: 26

## 2020-06-16 NOTE — OB PROVIDER TRIAGE NOTE - HISTORY OF PRESENT ILLNESS
Patient is 34 y/o  @ wks gestation who was sent from the ATU for Prolonged monitoring;  S/P ECV.  Denies contractions, lof or vaginal bleeding.    AP Course:  breech presentation  Meds - pnv  NKDA

## 2020-06-16 NOTE — OB RN TRIAGE NOTE - CHIEF COMPLAINT QUOTE
sent from ATU, s/p version, for monitoring nonreactive tracing; pt states + fetal movement but less since version done  *scheduled c/s 6/26*

## 2020-06-16 NOTE — OB PROVIDER TRIAGE NOTE - NSHPPHYSICALEXAM_GEN_ALL_CORE
Vital Signs Last 24 Hrs  T(C): 36.5 (16 Jun 2020 12:30), Max: 36.5 (16 Jun 2020 12:25)  T(F): 97.7 (16 Jun 2020 12:30), Max: 97.7 (16 Jun 2020 12:25)  HR: 90 (16 Jun 2020 13:30) (78 - 90)  BP: 105/63 (16 Jun 2020 13:30) (100/63 - 108/75)  BP(mean): --  RR: 17 (16 Jun 2020 12:30) (16 - 17)  SpO2: 98% (15 Mark 2020 14:26) (98% - 98%)    Abdomen gravid, soft and nontender  NST - Vital Signs Last 24 Hrs  T(C): 36.5 (16 Jun 2020 12:30), Max: 36.5 (16 Jun 2020 12:25)  T(F): 97.7 (16 Jun 2020 12:30), Max: 97.7 (16 Jun 2020 12:25)  HR: 90 (16 Jun 2020 13:30) (78 - 90)  BP: 105/63 (16 Jun 2020 13:30) (100/63 - 108/75)  BP(mean): --  RR: 17 (16 Jun 2020 12:30) (16 - 17)  SpO2: 98% (15 Mark 2020 14:26) (98% - 98%)    Abdomen gravid, soft and nontender  NST - cat 1 fht

## 2020-06-16 NOTE — OB PROVIDER TRIAGE NOTE - NSOBPROVIDERNOTE_OBGYN_ALL_OB_FT
Patient is 34 y/o  @ wks gestation who was sent from the ATU for Prolonged monitoring;  S/P ECV.  Denies contractions, lof or vaginal bleeding.    AP Course:  breech presentation  Meds - pnv  NKDA      PMH/PSH/GYN/SH - denies  OB -  - 11/[id number] - 6lbs 5oz    Vital Signs Last 24 Hrs  T(C): 36.5 (2020 12:30), Max: 36.5 (2020 12:25)  T(F): 97.7 (2020 12:30), Max: 97.7 (2020 12:25)  HR: 90 (2020 13:30) (78 - 90)  BP: 105/63 (2020 13:30) (100/63 - 108/75)  BP(mean): --  RR: 17 (2020 12:30) (16 - 17)  SpO2: 98% (15 Mark 2020 14:26) (98% - 98%)    Abdomen gravid, soft and nontender  NST - Patient is 34 y/o  @ wks gestation who was sent from the ATU for Prolonged monitoring;  S/P ECV.  Denies contractions, lof or vaginal bleeding.    AP Course:  breech presentation  Meds - pnv  NKDA      PMH/PSH/GYN/SH - denies  OB -  - 11/[id number] - 6lbs 5oz    Vital Signs Last 24 Hrs  T(C): 36.5 (2020 12:30), Max: 36.5 (2020 12:25)  T(F): 97.7 (2020 12:30), Max: 97.7 (2020 12:25)  HR: 90 (2020 13:30) (78 - 90)  BP: 105/63 (2020 13:30) (100/63 - 108/75)  BP(mean): --  RR: 17 (2020 12:30) (16 - 17)  SpO2: 98% (15 Mark 2020 14:26) (98% - 98%)    Abdomen gravid, soft and nontender  NST - cat 1 fht    Patient to return to the ATU tomorrow for f/u    Discussed patient with  and FHT reviewed by Dr Pacheco  Plan :  patient is cleared for discharge home.  Labor precautions and fetal kick count discussed with patient

## 2020-06-16 NOTE — OB PROVIDER TRIAGE NOTE - ADDITIONAL INSTRUCTIONS
Patient is cleared for discharge home.  Labor precautions and fetal kick count discussed with patient

## 2020-06-17 ENCOUNTER — ASOB RESULT (OUTPATIENT)
Age: 35
End: 2020-06-17

## 2020-06-17 ENCOUNTER — APPOINTMENT (OUTPATIENT)
Dept: ANTEPARTUM | Facility: HOSPITAL | Age: 35
End: 2020-06-17
Payer: COMMERCIAL

## 2020-06-17 PROCEDURE — 59025 FETAL NON-STRESS TEST: CPT | Mod: 26

## 2020-06-26 ENCOUNTER — INPATIENT (INPATIENT)
Facility: HOSPITAL | Age: 35
LOS: 1 days | Discharge: HOME | End: 2020-06-28
Attending: OBSTETRICS & GYNECOLOGY | Admitting: OBSTETRICS & GYNECOLOGY
Payer: COMMERCIAL

## 2020-06-26 ENCOUNTER — TRANSCRIPTION ENCOUNTER (OUTPATIENT)
Age: 35
End: 2020-06-26

## 2020-06-26 ENCOUNTER — RESULT REVIEW (OUTPATIENT)
Age: 35
End: 2020-06-26

## 2020-06-26 VITALS — HEART RATE: 85 BPM | DIASTOLIC BLOOD PRESSURE: 71 MMHG | SYSTOLIC BLOOD PRESSURE: 114 MMHG

## 2020-06-26 LAB
BASOPHILS # BLD AUTO: 0.02 K/UL — SIGNIFICANT CHANGE UP (ref 0–0.2)
BASOPHILS NFR BLD AUTO: 0.2 % — SIGNIFICANT CHANGE UP (ref 0–2)
BLD GP AB SCN SERPL QL: NEGATIVE — SIGNIFICANT CHANGE UP
EOSINOPHIL # BLD AUTO: 0.05 K/UL — SIGNIFICANT CHANGE UP (ref 0–0.5)
EOSINOPHIL NFR BLD AUTO: 0.4 % — SIGNIFICANT CHANGE UP (ref 0–6)
HCT VFR BLD CALC: 30.6 % — LOW (ref 34.5–45)
HGB BLD-MCNC: 10.5 G/DL — LOW (ref 11.5–15.5)
IMM GRANULOCYTES NFR BLD AUTO: 0.5 % — SIGNIFICANT CHANGE UP (ref 0–1.5)
LYMPHOCYTES # BLD AUTO: 18.7 % — SIGNIFICANT CHANGE UP (ref 13–44)
LYMPHOCYTES # BLD AUTO: 2.39 K/UL — SIGNIFICANT CHANGE UP (ref 1–3.3)
MCHC RBC-ENTMCNC: 28.5 PG — SIGNIFICANT CHANGE UP (ref 27–34)
MCHC RBC-ENTMCNC: 34.3 % — SIGNIFICANT CHANGE UP (ref 32–36)
MCV RBC AUTO: 83.2 FL — SIGNIFICANT CHANGE UP (ref 80–100)
MONOCYTES # BLD AUTO: 1 K/UL — HIGH (ref 0–0.9)
MONOCYTES NFR BLD AUTO: 7.8 % — SIGNIFICANT CHANGE UP (ref 2–14)
NEUTROPHILS # BLD AUTO: 9.24 K/UL — HIGH (ref 1.8–7.4)
NEUTROPHILS NFR BLD AUTO: 72.4 % — SIGNIFICANT CHANGE UP (ref 43–77)
NRBC # FLD: 0 K/UL — SIGNIFICANT CHANGE UP (ref 0–0)
PLATELET # BLD AUTO: 299 K/UL — SIGNIFICANT CHANGE UP (ref 150–400)
PMV BLD: 11.1 FL — SIGNIFICANT CHANGE UP (ref 7–13)
RBC # BLD: 3.68 M/UL — LOW (ref 3.8–5.2)
RBC # FLD: 14.2 % — SIGNIFICANT CHANGE UP (ref 10.3–14.5)
RH IG SCN BLD-IMP: POSITIVE — SIGNIFICANT CHANGE UP
RH IG SCN BLD-IMP: POSITIVE — SIGNIFICANT CHANGE UP
SARS-COV-2 RNA SPEC QL NAA+PROBE: SIGNIFICANT CHANGE UP
T PALLIDUM AB TITR SER: NEGATIVE — SIGNIFICANT CHANGE UP
WBC # BLD: 12.77 K/UL — HIGH (ref 3.8–10.5)
WBC # FLD AUTO: 12.77 K/UL — HIGH (ref 3.8–10.5)

## 2020-06-26 PROCEDURE — 88307 TISSUE EXAM BY PATHOLOGIST: CPT | Mod: 26

## 2020-06-26 RX ORDER — HYDROCORTISONE 1 %
1 OINTMENT (GRAM) TOPICAL EVERY 6 HOURS
Refills: 0 | Status: DISCONTINUED | OUTPATIENT
Start: 2020-06-26 | End: 2020-06-28

## 2020-06-26 RX ORDER — SODIUM CHLORIDE 9 MG/ML
3 INJECTION INTRAMUSCULAR; INTRAVENOUS; SUBCUTANEOUS EVERY 8 HOURS
Refills: 0 | Status: DISCONTINUED | OUTPATIENT
Start: 2020-06-26 | End: 2020-06-28

## 2020-06-26 RX ORDER — ACETAMINOPHEN 500 MG
975 TABLET ORAL
Refills: 0 | Status: DISCONTINUED | OUTPATIENT
Start: 2020-06-26 | End: 2020-06-27

## 2020-06-26 RX ORDER — TETANUS TOXOID, REDUCED DIPHTHERIA TOXOID AND ACELLULAR PERTUSSIS VACCINE, ADSORBED 5; 2.5; 8; 8; 2.5 [IU]/.5ML; [IU]/.5ML; UG/.5ML; UG/.5ML; UG/.5ML
0.5 SUSPENSION INTRAMUSCULAR ONCE
Refills: 0 | Status: DISCONTINUED | OUTPATIENT
Start: 2020-06-26 | End: 2020-06-28

## 2020-06-26 RX ORDER — BENZOCAINE 10 %
1 GEL (GRAM) MUCOUS MEMBRANE EVERY 6 HOURS
Refills: 0 | Status: DISCONTINUED | OUTPATIENT
Start: 2020-06-26 | End: 2020-06-28

## 2020-06-26 RX ORDER — DIPHENHYDRAMINE HCL 50 MG
25 CAPSULE ORAL EVERY 6 HOURS
Refills: 0 | Status: DISCONTINUED | OUTPATIENT
Start: 2020-06-26 | End: 2020-06-28

## 2020-06-26 RX ORDER — MAGNESIUM HYDROXIDE 400 MG/1
30 TABLET, CHEWABLE ORAL
Refills: 0 | Status: DISCONTINUED | OUTPATIENT
Start: 2020-06-26 | End: 2020-06-28

## 2020-06-26 RX ORDER — AER TRAVELER 0.5 G/1
1 SOLUTION RECTAL; TOPICAL EVERY 4 HOURS
Refills: 0 | Status: DISCONTINUED | OUTPATIENT
Start: 2020-06-26 | End: 2020-06-28

## 2020-06-26 RX ORDER — SIMETHICONE 80 MG/1
80 TABLET, CHEWABLE ORAL EVERY 4 HOURS
Refills: 0 | Status: DISCONTINUED | OUTPATIENT
Start: 2020-06-26 | End: 2020-06-28

## 2020-06-26 RX ORDER — OXYTOCIN 10 UNIT/ML
333.33 VIAL (ML) INJECTION
Qty: 20 | Refills: 0 | Status: DISCONTINUED | OUTPATIENT
Start: 2020-06-26 | End: 2020-06-27

## 2020-06-26 RX ORDER — PRAMOXINE HYDROCHLORIDE 150 MG/15G
1 AEROSOL, FOAM RECTAL EVERY 4 HOURS
Refills: 0 | Status: DISCONTINUED | OUTPATIENT
Start: 2020-06-26 | End: 2020-06-28

## 2020-06-26 RX ORDER — DIBUCAINE 1 %
1 OINTMENT (GRAM) RECTAL EVERY 6 HOURS
Refills: 0 | Status: DISCONTINUED | OUTPATIENT
Start: 2020-06-26 | End: 2020-06-28

## 2020-06-26 RX ORDER — OXYTOCIN 10 UNIT/ML
333.33 VIAL (ML) INJECTION
Qty: 20 | Refills: 0 | Status: DISCONTINUED | OUTPATIENT
Start: 2020-06-26 | End: 2020-06-26

## 2020-06-26 RX ORDER — LANOLIN
1 OINTMENT (GRAM) TOPICAL EVERY 6 HOURS
Refills: 0 | Status: DISCONTINUED | OUTPATIENT
Start: 2020-06-26 | End: 2020-06-28

## 2020-06-26 RX ORDER — KETOROLAC TROMETHAMINE 30 MG/ML
30 SYRINGE (ML) INJECTION ONCE
Refills: 0 | Status: DISCONTINUED | OUTPATIENT
Start: 2020-06-26 | End: 2020-06-26

## 2020-06-26 RX ORDER — IBUPROFEN 200 MG
600 TABLET ORAL EVERY 6 HOURS
Refills: 0 | Status: DISCONTINUED | OUTPATIENT
Start: 2020-06-26 | End: 2020-06-27

## 2020-06-26 RX ORDER — OXYTOCIN 10 UNIT/ML
2 VIAL (ML) INJECTION
Qty: 30 | Refills: 0 | Status: DISCONTINUED | OUTPATIENT
Start: 2020-06-26 | End: 2020-06-27

## 2020-06-26 RX ORDER — CITRIC ACID/SODIUM CITRATE 300-500 MG
15 SOLUTION, ORAL ORAL EVERY 6 HOURS
Refills: 0 | Status: DISCONTINUED | OUTPATIENT
Start: 2020-06-26 | End: 2020-06-26

## 2020-06-26 RX ORDER — SODIUM CHLORIDE 9 MG/ML
1000 INJECTION, SOLUTION INTRAVENOUS
Refills: 0 | Status: DISCONTINUED | OUTPATIENT
Start: 2020-06-26 | End: 2020-06-26

## 2020-06-26 RX ADMIN — Medication 2 MILLIUNIT(S)/MIN: at 16:06

## 2020-06-26 RX ADMIN — SODIUM CHLORIDE 125 MILLILITER(S): 9 INJECTION, SOLUTION INTRAVENOUS at 07:51

## 2020-06-26 RX ADMIN — SODIUM CHLORIDE 125 MILLILITER(S): 9 INJECTION, SOLUTION INTRAVENOUS at 05:11

## 2020-06-26 RX ADMIN — SODIUM CHLORIDE 3 MILLILITER(S): 9 INJECTION INTRAMUSCULAR; INTRAVENOUS; SUBCUTANEOUS at 22:00

## 2020-06-26 RX ADMIN — Medication 30 MILLIGRAM(S): at 22:33

## 2020-06-26 RX ADMIN — Medication 1000 MILLIUNIT(S)/MIN: at 18:20

## 2020-06-26 NOTE — OB PROVIDER H&P - NS_VTERISK_OBGYN_ALL_OB
I personally performed the service described in the documentation recorded by the scribe in my presence, and it accurately and completely records my words and actions.
None

## 2020-06-26 NOTE — CHART NOTE - NSCHARTNOTEFT_GEN_A_CORE
Pt seen and examined for cervical change. Pt c/o contractions 4/10 pain every 5 min. Denies VB/LOF. +FM.     PE:  Vital Signs Last 24 Hrs  T(C): 37 (26 Jun 2020 09:50), Max: 37 (26 Jun 2020 04:27)  T(F): 98.6 (26 Jun 2020 09:50), Max: 98.6 (26 Jun 2020 04:27)  HR: 75 (26 Jun 2020 09:50) (75 - 90)  BP: 110/83 (26 Jun 2020 09:50) (110/83 - 114/71)  BP(mean): --  RR: 16 (26 Jun 2020 09:50) (16 - 17)  SpO2: 99% (26 Jun 2020 09:50) (97% - 99%)  EFM: 135 moderate variability + acels variable decelx1  [place]: Q4-5 min  VE:1.5cm around CB    Plan:  -continue efm/toco  - continue PO cytotec  - continue cervical balloon    d/w Dr Osbaldo Allenjack [facility]

## 2020-06-26 NOTE — DISCHARGE NOTE OB - CARE PROVIDER_API CALL
Susanna Pacheco  OBSTETRICS AND GYNECOLOGY  [address] [address] [place], [place] [address]  Phone: [phone]  Fax: [phone]  Established Patient  Follow Up Time: 2 weeks

## 2020-06-26 NOTE — DISCHARGE NOTE OB - MEDICATION SUMMARY - MEDICATIONS TO TAKE
I will START or STAY ON the medications listed below when I get home from the hospital:    Prenatal 1 oral capsule  -- Indication: For Pregnancy I will START or STAY ON the medications listed below when I get home from the hospital:    ibuprofen 600 mg oral tablet  -- 1 tab(s) by mouth every 6 hours  -- Indication: For Pain    acetaminophen 325 mg oral tablet  -- 3 tab(s) by mouth   -- Indication: For Pain    Prenatal 1 oral capsule  -- Indication: For supplement

## 2020-06-26 NOTE — DISCHARGE NOTE OB - PATIENT PORTAL LINK FT
You can access the FollowMyHealth Patient Portal offered by [facility] by registering at the following website: [email or web address]. By joining [employer]’s FollowMyHealth portal, you will also be able to view your health information using other applications (apps) compatible with our system.

## 2020-06-26 NOTE — DISCHARGE NOTE OB - PLAN OF CARE
delivery f/u in 6 wks return to usual activities nothing per vagina x 6weeks, follow-up appointment in 6 weeks

## 2020-06-26 NOTE — CHART NOTE - NSCHARTNOTEFT_GEN_A_CORE
Patient seen and examined for increased pain 8/10 now requesting epidural. CB found to be dislodged in vaginal vault and removed with gentle traction. Pt denies LOF. Reports some brown discharge. +FM.     PE:  Vital Signs Last 24 Hrs  T(C): 37 (26 Jun 2020 09:50), Max: 37 (26 Jun 2020 04:27)  T(F): 98.6 (26 Jun 2020 09:50), Max: 98.6 (26 Jun 2020 04:27)  HR: 75 (26 Jun 2020 09:50) (75 - 90)  BP: 110/83 (26 Jun 2020 09:50) (110/83 - 114/71)  BP(mean): --  RR: 16 (26 Jun 2020 09:50) (16 - 17)  SpO2: 99% (26 Jun 2020 09:50) (97% - 99%)  EFM: 135 moderate variability + acels no decels  [place]: Q4 min  VE:4/50/-3 bulging membranes     Plan:  Transfer to L&D for epidural  continue PO cytotec  continue efm/toco    d/w Dr Osbaldo Peres [facility]

## 2020-06-26 NOTE — CHART NOTE - NSCHARTNOTEFT_GEN_A_CORE
Pt resting comfortably without complaints. Pt seen for cervical balloon placement. Cervical balloon inserted using sterile technique. 80mL instilled in uterine balloon 60cc instilled in vaginal balloon. pt tolerated well.    PE:  Vital Signs Last 24 Hrs  T(C): 37 (26 Jun 2020 04:27), Max: 37 (26 Jun 2020 04:27)  T(F): 98.6 (26 Jun 2020 04:27), Max: 98.6 (26 Jun 2020 04:27)  HR: 90 (26 Jun 2020 04:53) (75 - 90)  BP: 114/71 (26 Jun 2020 04:27) (114/71 - 114/71)  BP(mean): --  RR: 17 (26 Jun 2020 04:27) (17 - 17)  SpO2: 97% (26 Jun 2020 04:53) (97% - 99%)  EFM: 135 moderate variability + acels no decel  [place]: Q5-6 min  VE:0.5/50/-3    Plan:  -cont. PO cytotec  -Cont Cervical balloon  -cont efm/toco    d/w Dr Osbaldo Allenjack [facility] Pt resting comfortably without complaints. Pt seen for cervical balloon placement. Cervical balloon inserted using sterile technique. 80mL instilled in uterine balloon 60cc instilled in vaginal balloon. pt tolerated well.    PE:  Vital Signs Last 24 Hrs  T(C): 37 (26 Jun 2020 04:27), Max: 37 (26 Jun 2020 04:27)  T(F): 98.6 (26 Jun 2020 04:27), Max: 98.6 (26 Jun 2020 04:27)  HR: 90 (26 Jun 2020 04:53) (75 - 90)  BP: 114/71 (26 Jun 2020 04:27) (114/71 - 114/71)  BP(mean): --  RR: 17 (26 Jun 2020 04:27) (17 - 17)  SpO2: 97% (26 Jun 2020 04:53) (97% - 99%)  EFM: 135 moderate variability + acels no decel  [place]: Q5-6 min  VE:0.5/50/-3    Plan:  -cont. PO cytotec  -Cont Cervical balloon  -cont efm/toco    d/w Dr Osbaldo Fink [facility]    Attending note   agree with above  pt seen and examined   IOL for IUGR   cervical balloon and po cytotec  category 1 tracing     CARMITA Banuelos MD MSc

## 2020-06-26 NOTE — CHART NOTE - NSCHARTNOTEFT_GEN_A_CORE
ATtending Note     PT ruptured spontaneously   Feeling intermittent pressure     AFVSS  GEn in NAD   ABd soft, gravid  Ext: no c/c/e    SVE 5/70/-3   FHTs had decel after sprague placed, return to baseline 150s, minimal variability   continue IV bolus/oxgyen    Plan   will continue to resuscitate and start pitocin after tracing improve    R Osbaldo-Mitchel

## 2020-06-26 NOTE — CHART NOTE - NSCHARTNOTEFT_GEN_A_CORE
Attending Note     Pt feeling more pressure     AFVSS  Gen in NAD   Abd soft, gravid  Ext: no c/c/e    SVE 5/7/-3  FHTs 125 mod maia no decels + accels   TOCO irregular     A/P:  now transitioning into active labor   will get epidural then pitocin after   FHTs category 1     CARMITA Alfaro

## 2020-06-26 NOTE — OB PROVIDER H&P - HISTORY OF PRESENT ILLNESS
34 yo  at 39+1 presenting for scheduled IOL for IUGR (10%, AC 3%, normal doppler). Denies ctx, lof, vb. +fm.     PNC uncomplicated. Denies GDM. Denies HTN.   ObHx FT   6#5  GynHx denies  PMHx denies   PSurgHx denies  Meds PNV  All NKDA    GBS neg   EFW 2650  Sono Vtx

## 2020-06-26 NOTE — OB RN DELIVERY SUMMARY - NS_LABORCHARACTER_OBGYN_ALL_OB
Induction of labor-AROM/Augmentation of labor/Internal electronic FM/External electronic FM
External electronic FM/Induction of labor-Medicinal

## 2020-06-26 NOTE — OB RN DELIVERY SUMMARY - BABY A SEX
add MVI and vit C 500mg daily to further aid in wound healing. Monitor tube feed tolerance. Obtain daily weights to monitor trends.
Male
Female

## 2020-06-26 NOTE — OB RN DELIVERY SUMMARY - NS_INTRAPARTUMABXTYPE_OBGYN_ALL_OB
No antibiotics or any antibiotics < 2 hrs prior to birth
No antibiotics or any antibiotics < 2 hrs prior to birth

## 2020-06-26 NOTE — OB RN DELIVERY SUMMARY - NS_RNDELIVATTEST_OBGYN_ALL_OB
OB Provider reported that the vagina was inspected and no foreign body was found/Laps, needles and instrument count was correct
OB Provider reported that the vagina was inspected and no foreign body was found/Laps, needles and instrument count was correct

## 2020-06-26 NOTE — OB PROVIDER DELIVERY SUMMARY - NSPROVIDERDELIVERYNOTE_OBGYN_ALL_OB_FT
Pt became FD and pushed to bring vtx to +3. FH with persistent decel to 70s for 5 min.  Pt and  told given fhr abnormality, recommended assistance with vacuum. Pt agreed to assistance. Along with maternal effort, vacuum assistance applied and vtx delivered from OA. Vacuum removed and pt pushed to deliver liveborn female apgars 9/9 over 2nd degree perineal laceration  Placenta delivered with 3vc-sent to pathology due to IUGR  laceration repaired with 2-0 chromic  ebl 200ml

## 2020-06-26 NOTE — OB RN DELIVERY SUMMARY - NS_SEPSISRSKCALC_OBGYN_ALL_OB_FT
EOS calculated successfully. EOS Risk Factor: 0.5/1000 live births ([facility] national incidence); GA=39w1d; Temp=98.6; ROM=6; GBS='Negative'; Antibiotics='No antibiotics or any antibiotics < 2 hrs prior to birth'
EOS calculated successfully. EOS Risk Factor: 0.5/1000 live births ([facility] national incidence); GA=39w1d; Temp=98.6; ROM=2.583; GBS='Negative'; Antibiotics='No antibiotics or any antibiotics < 2 hrs prior to birth'

## 2020-06-26 NOTE — DISCHARGE NOTE OB - CARE PLAN
Goal:	delivery  Assessment and plan of treatment:	f/u in 6 wks Goal:	return to usual activities  Assessment and plan of treatment:	nothing per vagina x 6weeks, follow-up appointment in 6 weeks Principal Discharge DX:	Vacuum extractor delivery, delivered  Goal:	return to usual activities  Assessment and plan of treatment:	nothing per vagina x 6weeks, follow-up appointment in 6 weeks

## 2020-06-26 NOTE — OB NEONATOLOGY/PEDIATRICIAN DELIVERY SUMMARY - NSPEDSNEONOTESA_OBGYN_ALL_OB_FT
Baby girl born at 39.2 wks via / to a _ y/o  A+blood type mother. Prenatal history of IUGR. No significant maternal history. PNL nr/immune/-, GBS - on . SROM at 3pm with clear fluids. Baby emerged vigorous, crying, was w/d/s/s with APGARS of 9/9. Mom would like to breast feed, requests Hep B. EOS 0.08

## 2020-06-26 NOTE — OB PROVIDER H&P - PROBLEM SELECTOR PLAN 1
- Admit to L+D  - routine labs, covid swab/partner swab   - GBS neg, no ppx  - IOL PO cyto --> Cb/pit when appropriate  - anticipate   dw Dr. Isidro Pena PGY1

## 2020-06-27 LAB
HCT VFR BLD CALC: 30.1 % — LOW (ref 34.5–45)
HGB BLD-MCNC: 10.4 G/DL — LOW (ref 11.5–15.5)

## 2020-06-27 RX ORDER — IBUPROFEN 200 MG
1 TABLET ORAL
Qty: 0 | Refills: 0 | DISCHARGE
Start: 2020-06-27

## 2020-06-27 RX ORDER — IBUPROFEN 200 MG
600 TABLET ORAL EVERY 6 HOURS
Refills: 0 | Status: DISCONTINUED | OUTPATIENT
Start: 2020-06-27 | End: 2020-06-28

## 2020-06-27 RX ORDER — ACETAMINOPHEN 500 MG
975 TABLET ORAL EVERY 6 HOURS
Refills: 0 | Status: DISCONTINUED | OUTPATIENT
Start: 2020-06-27 | End: 2020-06-28

## 2020-06-27 RX ORDER — ACETAMINOPHEN 500 MG
3 TABLET ORAL
Qty: 0 | Refills: 0 | DISCHARGE
Start: 2020-06-27

## 2020-06-27 RX ADMIN — SODIUM CHLORIDE 3 MILLILITER(S): 9 INJECTION INTRAMUSCULAR; INTRAVENOUS; SUBCUTANEOUS at 06:10

## 2020-06-27 RX ADMIN — Medication 600 MILLIGRAM(S): at 21:06

## 2020-06-27 RX ADMIN — SODIUM CHLORIDE 3 MILLILITER(S): 9 INJECTION INTRAMUSCULAR; INTRAVENOUS; SUBCUTANEOUS at 14:07

## 2020-06-27 RX ADMIN — Medication 975 MILLIGRAM(S): at 12:27

## 2020-06-27 RX ADMIN — SODIUM CHLORIDE 3 MILLILITER(S): 9 INJECTION INTRAMUSCULAR; INTRAVENOUS; SUBCUTANEOUS at 22:00

## 2020-06-27 RX ADMIN — Medication 1 TABLET(S): at 12:27

## 2020-06-27 NOTE — PROGRESS NOTE ADULT - SUBJECTIVE AND OBJECTIVE BOX
S: Patient doing well.   Pain controlled.  +OOB.  +void.  Tolerating PO.  Lochia WNL.    O: Vital Signs Last 24 Hrs  T(C): 36.7 (2020 11:01), Max: 37.3 (2020 21:30)  T(F): 98.1 (2020 11:01), Max: 99.1 (2020 21:30)  HR: 82 (2020 11:) (56 - 146)  BP: 110/78 (2020 11:) (89/53 - 138/71)  BP(mean): --  RR: 17 (2020 11:) (17 - 18)  SpO2: 99% (2020 11:) (63% - 100%)    Gen: NAD  Abd: soft, NT, ND.   fundus firm below umbilicus, NT.  Ext: no tenderness B/L, negative Taras's sign    Labs:                        10.4   x     )-----------( x        ( 2020 06:50 )             30.1       ABO Interpretation: A ( @ 05:38)    Rh Interpretation: Positive ( @ 05:38)    Antibody Screen Negative            A: 35y PPD#1 s/p  doing well.   -Continue routine postpartum care.  -Discharge planning.     MD Orion

## 2020-06-28 VITALS
OXYGEN SATURATION: 100 % | DIASTOLIC BLOOD PRESSURE: 66 MMHG | TEMPERATURE: 97 F | SYSTOLIC BLOOD PRESSURE: 122 MMHG | HEART RATE: 80 BPM | RESPIRATION RATE: 18 BRPM

## 2020-06-28 RX ADMIN — Medication 600 MILLIGRAM(S): at 03:46

## 2020-06-28 NOTE — PROVIDER CONTACT NOTE (OTHER) - ASSESSMENT
vital signs stable ,pt denies any preeclamptic symptoms ,pt states the headach does feel better when laying down

## 2020-06-28 NOTE — PROGRESS NOTE ADULT - SUBJECTIVE AND OBJECTIVE BOX
Subjective  Pain: none  Complaints:none  Milestones: Alert and orientedx3. Out of bed ambulating. Voiding. Tolerating a regular diet.  Infant feeding:                                 Feeding related issues or concerns:none    Objective   T(C): 36.3 (06-28-20 @ 05:40), Max: 36.9 (06-27-20 @ 17:51)  HR: 80 (06-28-20 @ 05:40) (72 - 86)  BP: 122/66 (06-28-20 @ 05:40) (110/78 - 123/64)  RR: 18 (06-28-20 @ 05:40) (16 - 18)  SpO2: 100% (06-28-20 @ 05:40) (99% - 100%)  Wt(kg): --    Breasts: soft, non-tender; no engorgement  Abd: Fundus firm; non-tender  Lochia:moderate  Lower extremities: no cyanosis, clubbing, or edema    LABS:                        10.4   x     )-----------( x        ( 27 Jun 2020 06:50 )             30.1     ABO Interpretation: A (06-26 @ 05:38)  Rh Interpretation: Positive (06-26 @ 05:38)          Plan: Increase ambulation, analgesia PRN and pain medication protocal standing oxycodone, ibuprofen and acetaminophen.  Diet: Continue regular diet      Continue routine postpartum care.

## 2020-07-13 PROBLEM — F32.9 MAJOR DEPRESSIVE DISORDER, SINGLE EPISODE, UNSPECIFIED: Chronic | Status: ACTIVE | Noted: 2019-06-08

## 2020-07-13 PROBLEM — F20.9 SCHIZOPHRENIA, UNSPECIFIED: Chronic | Status: ACTIVE | Noted: 2020-03-11

## 2020-07-29 LAB — SURGICAL PATHOLOGY STUDY: SIGNIFICANT CHANGE UP

## 2020-12-15 NOTE — OB PROVIDER TRIAGE NOTE - NS_CHIEFCOMPLAINTOTHER_OBGYN_ALL_OB_FT
From the ATU for Prolonged monitoring;  S/P ECV Home Suture Removal Text: Patient was provided a home suture removal kit and will remove their sutures at home.  If they have any questions or difficulties they will call the office.

## 2020-12-28 ENCOUNTER — OUTPATIENT (OUTPATIENT)
Dept: OUTPATIENT SERVICES | Facility: HOSPITAL | Age: 35
LOS: 1 days | Discharge: HOME | End: 2020-12-28
Payer: COMMERCIAL

## 2020-12-28 DIAGNOSIS — F20.9 SCHIZOPHRENIA, UNSPECIFIED: ICD-10-CM

## 2020-12-29 PROBLEM — Z34.90 ENCOUNTER FOR SUPERVISION OF NORMAL PREGNANCY, UNSPECIFIED, UNSPECIFIED TRIMESTER: Chronic | Status: ACTIVE | Noted: 2020-06-15

## 2021-03-17 PROCEDURE — 99214 OFFICE O/P EST MOD 30 MIN: CPT | Mod: 95

## 2021-04-22 PROCEDURE — 99214 OFFICE O/P EST MOD 30 MIN: CPT | Mod: 95

## 2021-05-20 PROCEDURE — 99214 OFFICE O/P EST MOD 30 MIN: CPT | Mod: 95

## 2021-09-15 PROCEDURE — 99443: CPT

## 2021-10-12 PROCEDURE — 99443: CPT

## 2021-11-16 PROCEDURE — 99214 OFFICE O/P EST MOD 30 MIN: CPT | Mod: 95

## 2022-03-16 ENCOUNTER — RESULT REVIEW (OUTPATIENT)
Age: 37
End: 2022-03-16

## 2022-04-22 NOTE — OB RN PATIENT PROFILE - EDUCATION PROVIDED ON ASSESSMENT OF INFANT "FEEDING CUES" AND THE IMPORTANCE OF FEEDING "ON CUE" / "BABY-LED" FEEDINGS
[FreeTextEntry1] : Problem\par 1)Hx Ovarian mass s/p BSO on 1/7/2019\par 2)Hx of left invasive breast cancer w/ lymphovascular involvement \par 3)BRCA Positive,FamHx of breast Ca, Paternal GM Breast Ca, possibly mother death of unknown reason (uterine v ovarian ca)\par \par Previous Therapy\par 1)CTAP 6/25/2018\par    a)Right ovarian mass of 3.9x3.9x3.4cm\par    b)Heterogenous and thickened endometrium and leiomyomatous uterus\par    c)Hepatic steatosis\par 2)CTAP 7/12/2018\par    a)Right adnexal mass of 4.6cm\par 3) Pelvic ultrasound 12/12/18\par     a)Right adenexal mass of 3.9cm, stable\par \par 4) Lapraoscopic BSO 1/2019\par a) Fallopian tube and ovary, left; salpingo-oophorectomy:\par - Ovary with benign inclusion cysts\par - Fallopian tube within normal limits\par \par b) Fallopian tube and ovary, right; salpingo-oophorectomy:\par - Ovary with fibrothecoma, 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 Pt has also completed radiation treatment. 
Statement Selected

## 2022-09-01 NOTE — OB RN TRIAGE NOTE - TEMPERATURE IN CELSIUS (DEGREES C)
[id number]  Mariana Dockery 31 y o  male MRN: [id number]  Encounter: [id number]    ASSESSMENT AND PLAN:      1  RUQ abdominal pain  Patient with intermittent right upper quadrant pain similar to biliary colic  Will obtain ultrasound to evaluate the patient has gallstones  If present and has continued attacks may need to see surgery for evaluation of cholecystectomy  Patient currently prefers conservative treatment of this time and he will avoid fatty fried foods and continue to exercise and diet in order for weight loss  - US right upper quadrant; Future  - Comprehensive metabolic panel  - CBC and differential    2  Gastroesophageal reflux disease without esophagitis  Patient with intermittent reflux symptoms  Currently will treat conservatively with p r n  Tums  Avoidance of triggers  Continued weight loss  Follow up Appointment:  As needed    Chief Complaint   Patient presents with    Abdominal Pain       HPI:   18-year-old male with no significant pmh who is presenting for evaluation of abdominal pain    Patient states that 6 months ago he had a right upper quadrant sharp abdominal pain that woke him up from sleep  Would notice that it would be present upon eating large meals  Patient states that it lasts about 30 minutes and resolved spontaneously  Happened about 4 times over the past 6 months  He also does note that he has water brash symptoms occasionally  Especially when he drinks coffee  As always he avoids is triggers and as he is losing weight he notices his reflux improving  Patient stated no reported lightheadedness, dizziness, fevers, chills, nausea, vomiting, dysphagia, heartburn, SOB, CP, abdominal pain, changes in bowel movement with diarrhea or constipation, GI bleeding, or unintentional weight loss      GI History:  Blood thinners: Denies ASA, antiplatelet, or anticoagulation  NSAID use: Denies    Social Hx: No regular ETOH, smoking, or drug use  Abdominal Surgical Hx: None  Family Hx: No first degree relatives with GI malignancies  GI procedure Hx: No hx of EGD or colonoscopies      Historical Information   History reviewed  No pertinent past medical history  History reviewed  No pertinent surgical history  Social History     Substance and Sexual Activity   Alcohol Use Yes    Comment: occasionally     Social History     Substance and Sexual Activity   Drug Use Never     Social History     Tobacco Use   Smoking Status Former Smoker   Smokeless Tobacco Never Used     Family History   Problem Relation Age of Onset    No Known Problems Mother     No Known Problems Father     No Known Problems Brother     No Known Problems Brother     No Known Problems Brother     Colon cancer Neg Hx     Colon polyps Neg Hx        Meds/Allergies   No current outpatient medications on file  Allergies   Allergen Reactions    Amoxicillin Rash    Erythromycin Rash       PHYSICAL EXAM:    Blood pressure 140/82, height 6' (1 829 m), weight 94 9 kg (209 lb 3 2 oz)  Body mass index is 28 37 kg/m²  General Appearance: NAD, cooperative, alert  Eyes: Anicteric, EOMI  ENT:  Normocephalic, atraumatic  Neck:  Supple, symmetrical, trachea midline  Resp:  Air entry present bilaterally  CV: S1, S2 present    GI:  Soft, non-tender, non-distended; normal bowel sounds; no masses, no organomegaly   Rectal: Deferred  Musculoskeletal: No cyanosis, clubbing or edema  Skin:  No jaundice, rashes, or lesions   Psych: Normal affect, good eye contact  Neuro: No gross deficits    Lab Results:   No results found for: WBC, HGB, MCV, PLT, INR  No results found for: NA, K, CL, CO2, ANIONGAP, BUN, CREATININE, GLUCOSE, GLUF, CALCIUM, CORRECTEDCA, AST, ALT, ALKPHOS, PROT, BILITOT, EGFR  No results found for: IRON, TIBC, FERRITIN  No results found for: LIPASE    Radiology Results:   No results found      REVIEW OF SYSTEMS:    CONSTITUTIONAL: Denies any fever, chills, rigors, and weight loss  HEENT: No earache or tinnitus  Denies hearing loss or visual disturbances  CARDIOVASCULAR: No chest pain or palpitations  RESPIRATORY: Denies any cough, hemoptysis, shortness of breath or dyspnea on exertion  GASTROINTESTINAL: As noted in the History of Present Illness  GENITOURINARY: No problems with urination  Denies any hematuria or dysuria  NEUROLOGIC: No dizziness or vertigo, denies headaches  MUSCULOSKELETAL: Denies any muscle or joint pain  SKIN: Denies skin rashes or itching  ENDOCRINE: Denies excessive thirst  Denies intolerance to heat or cold  PSYCHOSOCIAL: Denies depression or anxiety  Denies any recent memory loss  36.7

## 2023-10-07 NOTE — OB PROVIDER DELIVERY SUMMARY - NSPOSITIONATDECIA_OBGYN_ALL_OB
Nutrition Problem #1: Inadequate oral intake  Intervention: Food and/or Nutrient Delivery: Continue NPO  Nutritional Occiput Anterior

## 2024-10-18 NOTE — OB PROVIDER H&P - NS_STATION_OBGYN_ALL_OB_NU
27-year old patient arrived to initiate prenatal care.     HPI: . Patient's last menstrual period was 2024.  This was an unexpected pregnancy. She describes her main discomfort as back pain. Diffuse. Pre-pregnancy weight of 127 pounds.    Prenatal history significant for: Term vaginal delivery x 2, SAB x 1; STIs (chlamydia, gonorrhea, trichomonas, M. Genitaliium)    History significant for: NKA, STIs (chlamydia, gonorrhea, trichomonas, mycoplasma genitalium), ADHD, scoliosis, depression, HSV 1&2 (on labs)    The following portion of the patient's history were reviewed and updated as needed: allergies, current medications, past family history, past medical history, social history, surgical history, and problem list.    ROS: All systems reviewed and are negative with exception of the following: amenorrhea, fatigue, nausea, back pain      US ordered today, reviewed and shows IUP of 7w1d gestation by crown-rump length measurement of 10.2 mm. Estimated Date of Delivery: 25. Normal-appearing ovaries      Pap Smear 2020: NILM    Exam:  Wt: 132 lb for TWG of 2.268 kg (5 lb), B/P 104/62, FHTs 140   General Appearance:  healthy-appearing . Appropriate mood and behavior.  HEENT:  Neck supple, no thyroidmegaly.  Cardiorespiratory: HR str and reg. No murmur. Lungs clear. Resp even and unlabored.  Abd: Soft, nontender. No CVA tenderness.   Ext: Calves non-tender. No cyanosis or edema.    Diagnoses and all orders for this visit:    1. Encounter to determine fetal viability of pregnancy, single or unspecified fetus (Primary)  Ultrasound today and reviewed.     2. 7 weeks gestation of pregnancy  Plan for initial prenatal labs and referrals reviewed with orders entered.  -     ToxASSURE Select 13 (MW) - Urine, Clean Catch  -     ABO / Rh  -     CBC & Differential  -     Antibody Screen  -     Hepatitis B Surface Antigen  -     RPR, Rfx Qn RPR / Confirm TP  -     Rubella Antibody, IgG  -     Urine Culture  - , Urine, Clean Catch  -     HIV-1 / O / 2 Ag / Antibody  -     Varicella Zoster Antibody, IgG  -     Chlamydia trachomatis, Neisseria gonorrhoeae, Trichomonas vaginalis, PCR - Urine, Urine, Random Void  -     HCV Antibody Rfx To Qnt PCR  -     Hemoglobin A1c  -     TSH Rfx On Abnormal To Free T4  -     Ambulatory Referral to MFM/Perinatology  -     AMB Referral to Motherhood Connection Program (ONLY [place] Medicaid)    3. Multigravida in first trimester  Reviewed information in new OB packet, including OTC medications for use during pregnancy, safe/unsafe fish list, dental care in pregnancy, and [facility] parental and fetal chromosomal risk screening pamphlet. Discussed first trimester of pregnancy and discomforts, regular OB routine, the options of ffDNA/chromosomal risk and maternal carrier screening testing.  Discussed also avoiding raw/undercooked meats, concerns with deli meats, and avoidance of unpasteurized dairy products/soft cheeses. Encouraged healthy lifestyle measures. Advised to maintain regular activity and/or exercise. Discussed bleeding and pelvic pain warnings and other signs to report.     4. Screening, , for malformation by ultrasound  Discussed anatomy scan to be completed through perinatology at approximately 20-weeks gestation. Anatomy scan completed through collaboration with their practice to assess for possible anomalies or markers for aneuploidy.   -     Ambulatory Referral to MFM/Perinatology    5. Screening for cervical cancer  Screening within [facility]CCP screening guidelines.   -     Liquid-based Pap Smear, Screening  -     HPV DNA Probe, Direct - ThinPrep Vial, Cervix, Endocervix  -     HPV, Aptima High 16 / 18,45    6. Short interval between pregnancies affecting pregnancy, antepartum    7. Morning sickness  Discussed nausea relief measures both nonpharmacologic (small frequent meals or snacks, avoiding triggers, aromatherapy, selam, hard and/or sour candies or Preggie Pops, selam,  accu-pressure wrist bands, citrus/citric acid) and pharmacologic (both OTC and Rx). Encouraged adequate hydration and intake of calories. Patient to notify provider if symptoms persist or worsen.     8. Immunization counseling  Discussed influenza immunization recommendations during pregnancy. Declines immunization at this time.         Refer to AVS instructions for additional education provided with today's visit.         Return to the office in 4 weeks for routine prenatal visit with Dr. Velasquez and as needed with concerns.        This note has been signed electronically.     Nathalia Clark, KATY, APRN, CNM, RNC-OB      -3

## 2025-02-03 PROCEDURE — 99214 OFFICE O/P EST MOD 30 MIN: CPT

## 2025-02-06 PROCEDURE — 99214 OFFICE O/P EST MOD 30 MIN: CPT | Mod: 95

## 2025-02-07 ENCOUNTER — INPATIENT (INPATIENT)
Facility: HOSPITAL | Age: 40
LOS: 12 days | Discharge: HOME | End: 2025-02-20
Attending: STUDENT IN AN ORGANIZED HEALTH CARE EDUCATION/TRAINING PROGRAM | Admitting: PSYCHIATRY & NEUROLOGY
Payer: COMMERCIAL

## 2025-02-07 VITALS — TEMPERATURE: 98 F

## 2025-02-07 DIAGNOSIS — F25.9 SCHIZOAFFECTIVE DISORDER, UNSPECIFIED: ICD-10-CM

## 2025-02-07 LAB
AMPHET UR-MCNC: NEGATIVE — SIGNIFICANT CHANGE UP
APPEARANCE UR: ABNORMAL
BACTERIA # UR AUTO: ABNORMAL /HPF
BARBITURATES UR SCN-MCNC: NEGATIVE — SIGNIFICANT CHANGE UP
BENZODIAZ UR-MCNC: NEGATIVE — SIGNIFICANT CHANGE UP
BILIRUB UR-MCNC: NEGATIVE — SIGNIFICANT CHANGE UP
CAST: 1 /LPF — SIGNIFICANT CHANGE UP (ref 0–4)
COCAINE METAB.OTHER UR-MCNC: NEGATIVE — SIGNIFICANT CHANGE UP
COLOR SPEC: YELLOW — SIGNIFICANT CHANGE UP
CREATININE URINE RESULT, DAU: 177 MG/DL — SIGNIFICANT CHANGE UP
DIFF PNL FLD: ABNORMAL
FENTANYL UR QL SCN: NEGATIVE — SIGNIFICANT CHANGE UP
GLUCOSE UR QL: NEGATIVE MG/DL — SIGNIFICANT CHANGE UP
KETONES UR-MCNC: NEGATIVE MG/DL — SIGNIFICANT CHANGE UP
LEUKOCYTE ESTERASE UR-ACNC: ABNORMAL
METHADONE UR-MCNC: NEGATIVE — SIGNIFICANT CHANGE UP
NITRITE UR-MCNC: NEGATIVE — SIGNIFICANT CHANGE UP
OPIATES UR-MCNC: NEGATIVE — SIGNIFICANT CHANGE UP
OXYCODONE UR-MCNC: NEGATIVE — SIGNIFICANT CHANGE UP
PCP SPEC-MCNC: SIGNIFICANT CHANGE UP
PCP UR-MCNC: NEGATIVE — SIGNIFICANT CHANGE UP
PH UR: 7 — SIGNIFICANT CHANGE UP (ref 5–8)
PROT UR-MCNC: SIGNIFICANT CHANGE UP MG/DL
RBC CASTS # UR COMP ASSIST: 2 /HPF — SIGNIFICANT CHANGE UP (ref 0–4)
SP GR SPEC: 1.02 — SIGNIFICANT CHANGE UP (ref 1–1.03)
SQUAMOUS # UR AUTO: 18 /HPF — HIGH (ref 0–5)
THC UR QL: NEGATIVE — SIGNIFICANT CHANGE UP
UROBILINOGEN FLD QL: 1 MG/DL — SIGNIFICANT CHANGE UP (ref 0.2–1)
WBC UR QL: 6 /HPF — HIGH (ref 0–5)

## 2025-02-07 PROCEDURE — 99222 1ST HOSP IP/OBS MODERATE 55: CPT | Mod: GC

## 2025-02-07 RX ORDER — OLANZAPINE 10 MG/1
10 TABLET ORAL AT BEDTIME
Refills: 0 | Status: DISCONTINUED | OUTPATIENT
Start: 2025-02-07 | End: 2025-02-12

## 2025-02-07 RX ORDER — LORAZEPAM 4 MG/ML
2 VIAL (ML) INJECTION EVERY 6 HOURS
Refills: 0 | Status: DISCONTINUED | OUTPATIENT
Start: 2025-02-07 | End: 2025-02-12

## 2025-02-07 RX ORDER — LORAZEPAM 4 MG/ML
2 VIAL (ML) INJECTION ONCE
Refills: 0 | Status: DISCONTINUED | OUTPATIENT
Start: 2025-02-07 | End: 2025-02-12

## 2025-02-07 RX ORDER — MELATONIN 5 MG
5 TABLET ORAL AT BEDTIME
Refills: 0 | Status: DISCONTINUED | OUTPATIENT
Start: 2025-02-07 | End: 2025-02-20

## 2025-02-07 RX ORDER — LORAZEPAM 4 MG/ML
0.5 VIAL (ML) INJECTION EVERY 12 HOURS
Refills: 0 | Status: DISCONTINUED | OUTPATIENT
Start: 2025-02-07 | End: 2025-02-12

## 2025-02-07 RX ADMIN — OLANZAPINE 10 MILLIGRAM(S): 10 TABLET ORAL at 22:00

## 2025-02-07 NOTE — BH INPATIENT PSYCHIATRY ASSESSMENT NOTE - CURRENT MEDICATION
MEDICATIONS  (STANDING):    MEDICATIONS  (PRN):   MEDICATIONS  (STANDING):  OLANZapine 10 milliGRAM(s) Oral at bedtime    MEDICATIONS  (PRN):  LORazepam     Tablet 2 milliGRAM(s) Oral every 6 hours PRN agitation  LORazepam     Tablet 0.5 milliGRAM(s) Oral every 12 hours PRN anxiety  LORazepam   Injectable 2 milliGRAM(s) IntraMuscular once PRN agitation  melatonin. 5 milliGRAM(s) Oral at bedtime PRN Insomnia

## 2025-02-07 NOTE — BH INPATIENT PSYCHIATRY ASSESSMENT NOTE - NSBHATTESTCOMMENTATTENDFT_PSY_A_CORE
40 yo F, mood disorder with psychosis vs. schizoaffective disorder, 2 prior hospitalizations, no prior suicide attempts, no hx of violence, no legal hx, denies formal medical hx, admitted 9.13 walk in from [facility] due to worsening psychosis in context of recent stoppage of medication followed by resumption of Zyprexa a few days prior to this admission. Pt reports feeling well thus she stopped Olanzapine and Sertraline, which caused worsening of psychotic sx. She was started on Abilify which was ineffective and recently started on Olanzapine now @ 10mg. she reports poor sleep, poor appetite, low energy, anhedonia. She denies SI. no hx of violence. She also reports paranoia of being followed, hearing numerous voices, denies AH. Becomes guarded in expressing psychotic contents. She expresses desire to go back on medication. she relates not being able to work over the past few weeks due to her worsening psychosis. she is amenable to plan to start Olanzapine. No CO indicated at this time, once Olanzapine is better optimized, will consider resuming Zoloft but will hold off for now. Will need to obtain collateral.

## 2025-02-07 NOTE — BH INPATIENT PSYCHIATRY ASSESSMENT NOTE - RISK ASSESSMENT
Risk factors for suicide include mood and psychotic disorder, current depressed mood and psychosis, severe anxiety, non-compliance with treatment. Risk factors for violence include non-compliance with treatment. Protective factors include responsibility to family, engagement in treatment, positive therapeutic relationships, residential, relationship, employment, and affective stability, engagement in treatment, and sobriety.

## 2025-02-07 NOTE — BH INPATIENT PSYCHIATRY ASSESSMENT NOTE - HPI (INCLUDE ILLNESS QUALITY, SEVERITY, DURATION, TIMING, CONTEXT, MODIFYING FACTORS, ASSOCIATED SIGNS AND SYMPTOMS)
38yo F, domiciled with , children, and MIL, works in [employer] service, no PMH, PPH of depression, psychosis on Olanzapine and Zoloft, two prior psychiatric hospitalizations, admitted to the hospital voluntarily from crisis center for paranoid thoughts in the setting of non-medical adherence.     On interview, patient is calm, cooperative, linear, but anxious and vague and appears internally preoccupied. Pt states that she has been taking antidepressants and anti-psychotic medications for years. Was prescribed Olanzapine and Zoloft, was switched to Abilify for a period time, and then switched back to Olanzapine. Pt states she was taking medications for a while and doing well, psychiatric symptoms had abated, so she self-discontinued medications. Had stopped taking her medications for months. At some point, her symptoms returned, she started hearing voices and having paranoid thoughts, feeling as though was she being followed. Patient said she feels uncomfortable expounding upon the details of her paranoid thoughts or the content of the voices. Restarted her Olanzapine about 2-3 weeks ago, but since restarting medications, paranoid thought and auditory hallucinations have not abated. Patient reached out to out-patient psychiatric who recommended voluntary admission.    Endorses poor sleep recently and decreased interest in things she used to enjoy, such as playing with her kids. Endorsed vague episode of manic symptoms in the past, where she was getting less sleep ~5hrs, but still feeling energized to get projects done. Endorsed family history of schizophrenia on both her paternal and maternal sides. Denies family history of completed suicides or psychiatric history in her immediate family. Denies use of tobacco products or recreational drugs, endorses social EtOH use.     Per outpt psych note: Pt. states that she is taking Olanzapine 10 mg consistently, but is still having paranoid thoughts in particular IOR. Pt. states she is afraid to watch videos because she feels they are talking directly to her. Pt states that her anxiety has decreased has decreased, and she is feeling less tired. NP and pt. discussed plan of action and decided that pt. would come in for a voluntary hospitalization the next day.

## 2025-02-07 NOTE — BH INPATIENT PSYCHIATRY ASSESSMENT NOTE - MSE UNSTRUCTURED FT
Appearance: Dressed appropriately  Attitude / Behavior / relatedness: paranoid, anxious, calm, cooperative  Eye contact: fair eye contact  Motor: No abnormal movements, no psychomotor slowing or activation.  Speech: regular rate  Mood: anxious, paranoid  Affect: constricted  Thought Process: linear, organized  Thought Content: med management, paranoid thoughts of being followed, refused to elaborate on thoughts  Perceptual: endorses AH, denies VH  Insight: fair, good insight into need for treatment  Judgment: fair  Impulse control: intact    Cognition: grossly intact  Gait: Intact.

## 2025-02-07 NOTE — BH INPATIENT PSYCHIATRY ASSESSMENT NOTE - NSBHCHARTREVIEWVS_PSY_A_CORE FT
Vital Signs Last 24 Hrs  T(C): --  T(F): --  HR: --  BP: --  BP(mean): --  RR: --  SpO2: --     Vital Signs Last 24 Hrs  T(C): 36.6 (02-07-25 @ 15:27), Max: 36.6 (02-07-25 @ 15:06)  T(F): 97.9 (02-07-25 @ 15:27), Max: 97.9 (02-07-25 @ 15:06)  HR: --  BP: --  BP(mean): --  RR: --  SpO2: --    Orthostatic VS  02-07-25 @ 15:27  Lying BP: --/-- HR: --  Sitting BP: 124/77 HR: 88  Standing BP: 119/76 HR: 99  Site: --  Mode: --  Orthostatic VS  02-07-25 @ 15:06  Lying BP: --/-- HR: --  Sitting BP: 124/77 HR: 87  Standing BP: 119/76 HR: 99  Site: --  Mode: --

## 2025-02-07 NOTE — BH INPATIENT PSYCHIATRY ASSESSMENT NOTE - SELF INJURIOUS BEHAVIOR WITHOUT SUICIDAL INTENT:
None known Melolabial Transposition Flap Text: The defect edges were debeveled with a #15 scalpel blade.  Given the location of the defect and the proximity to free margins a melolabial flap was deemed most appropriate.  Using a sterile surgical marker, an appropriate melolabial transposition flap was drawn incorporating the defect.    The area thus outlined was incised deep to adipose tissue with a #15 scalpel blade.  The skin margins were undermined to an appropriate distance in all directions utilizing iris scissors.

## 2025-02-07 NOTE — BH PATIENT PROFILE - HOME MEDICATIONS
acetaminophen 325 mg oral tablet , 3 tab(s) orally   ibuprofen 600 mg oral tablet , 1 tab(s) orally every 6 hours  Prenatal 1 oral capsule  PNV Prenatal oral tablet , 1 tab(s) orally once a day  OLANZapine 5 mg oral tablet , 1 tab(s) orally once a day  Zoloft 50 mg oral tablet , 1 tab(s) orally once a day  sertraline 100 mg oral tablet , 1 tab(s) orally once a day   LORazepam 1 mg oral tablet , 1 tab(s) orally 3 times a day MDD:3 mg/day  risperiDONE 4 mg oral tablet , 1 tab(s) orally once a day (at bedtime)

## 2025-02-07 NOTE — CHART NOTE - NSCHARTNOTEFT_GEN_A_CORE
Screening Medical Evaluation    Patient Admitted from: [facility].     [facility] admitting diagnosis: Schizoaffective disorder.      PAST MEDICAL & SURGICAL HISTORY:  Depression  therapist      Schizophrenia      No significant past surgical history      No significant past surgical history            Allergies    No Known Allergies    Intolerances          Social History:       FAMILY HISTORY:        MEDICATIONS  (STANDING):  OLANZapine 10 milliGRAM(s) Oral at bedtime    MEDICATIONS  (PRN):  LORazepam     Tablet 2 milliGRAM(s) Oral every 6 hours PRN agitation  LORazepam     Tablet 0.5 milliGRAM(s) Oral every 12 hours PRN anxiety  LORazepam   Injectable 2 milliGRAM(s) IntraMuscular once PRN agitation  melatonin. 5 milliGRAM(s) Oral at bedtime PRN Insomnia        Vital Signs Last 24 Hrs  T(C): 36.6 (2025 15:27), Max: 36.6 (2025 15:06)  T(F): 97.9 (2025 15:27), Max: 97.9 (2025 15:06)  HR: -- 88b/ min  BP: -- 119/ 76  RR: -- 16b/ min       CAPILLARY BLOOD GLUCOSE            PHYSICAL EXAM:  GENERAL: NAD  HEAD:  Atraumatic, Normocephalic  EYES: Wears glasses, EOMI, PERRLA, conjunctiva and sclera clear  NECK: Supple, No JVD  CHEST/LUNG: Clear to auscultation bilaterally; No wheeze  HEART: Regular rate and rhythm; No murmurs, rubs, or gallops  ABDOMEN: Positive BS, Soft, Nontender.   EXTREMITIES:  2+ Peripheral Pulses, No clubbing, cyanosis, or edema  PSYCH: Calm and cooperative   NEUROLOGY: non-focal  SKIN: No rashes or lesions seen on exposed skin.     LABS:                Urinalysis Basic - ( 2025 16:55 )    Color: Yellow / Appearance: Cloudy / S.023 / pH: x  Gluc: x / Ketone: Negative mg/dL  / Bili: Negative / Urobili: 1.0 mg/dL   Blood: x / Protein: Trace mg/dL / Nitrite: Negative   Leuk Esterase: Trace / RBC: 2 /HPF / WBC 6 /HPF   Sq Epi: x / Non Sq Epi: 18 /HPF / Bacteria: Few /HPF        RADIOLOGY & ADDITIONAL TESTS:      Assessment and Plan:  39F admitted to [facility] for Schizoaffective disorder.  No PMHx.  Pt seen for medical screening evaluation. Patient has no acute complaints at this time. Patient denies fever, chills, headache, dizziness, lightheadedness, N/V, SOB, cough, congestion, chest pain, abdominal pain, dysuria, hematuria, diarrhea, constipation. Physical exam unremarkable, VSS. Labs pending. EKG pending.     1.) Schizoaffective disorder: F/U EKG to assess QT/ QTC. Plan per primary team.

## 2025-02-07 NOTE — BH INPATIENT PSYCHIATRY ASSESSMENT NOTE - NSBHASSESSSUMMFT_PSY_ALL_CORE
40yo F, domiciled with , children, and MIL, works in [employer] service, no PMH, PPH of depression, psychosis on Olanzapine and Zoloft, two prior psychiatric hospitalizations, admitted to the hospital voluntarily from crisis center for paranoid thoughts in the setting of non-medical adherence. On admission interview, patient is calm, cooperative, linear, but anxious and vague and appears internally preoccupied. Pt states that she has been taking antidepressants and anti-psychotic medications for years. Was prescribed Olanzapine and Zoloft, was switched to Abilify for a period time, and then switched back to Olanzapine. Pt states she was taking medications for a while and doing well, psychiatric symptoms had abated, so she self-discontinued medications. Had stopped taking her medications for months. At some point, her symptoms returned, she started hearing voices and having paranoid thoughts, feeling as though was she being followed. Patient said she feels uncomfortable expounding upon the details of her paranoid thoughts or the content of the voices. Restarted her Olanzapine about 2-3 weeks ago, but since restarting medications, paranoid thought and auditory hallucinations have not abated. Patient reached out to out-patient psychiatric who recommended voluntary admission.    Patient is calm, cooperative, linear, but anxious and vague. Appears internally preoccupied. Demonstrates good insight into need for treatment. To continue home Olanzapine. Will consider restarting antidepressant.    Olanzapine 10mg for psychosis  Melantonin PRN for insomnia  Lorazepam 0.5mg PRN for anxiety  Lorazepam PRN agitation

## 2025-02-07 NOTE — BH PATIENT PROFILE - FALL HARM RISK - UNIVERSAL INTERVENTIONS
Bed in lowest position, wheels locked, appropriate side rails in place/Call bell, personal items and telephone in reach/Instruct patient to call for assistance before getting out of bed or chair/Non-slip footwear when patient is out of bed/[place] to call system/Physically safe environment - no spills, clutter or unnecessary equipment/Purposeful Proactive Rounding/Room/bathroom lighting operational, light cord in reach

## 2025-02-07 NOTE — BH INPATIENT PSYCHIATRY ASSESSMENT NOTE - NSBHMETABOLIC_PSY_ALL_CORE_FT
BMI:   HbA1c:   Glucose:   BP: --Vital Signs Last 24 Hrs  T(C): --  T(F): --  HR: --  BP: --  BP(mean): --  RR: --  SpO2: --      Lipid Panel:  BMI: BMI (kg/m2): 23 (02-07-25 @ 15:27)  HbA1c:   Glucose:   BP: --Vital Signs Last 24 Hrs  T(C): 36.6 (02-07-25 @ 15:27), Max: 36.6 (02-07-25 @ 15:06)  T(F): 97.9 (02-07-25 @ 15:27), Max: 97.9 (02-07-25 @ 15:06)  HR: --  BP: --  BP(mean): --  RR: --  SpO2: --    Orthostatic VS  02-07-25 @ 15:27  Lying BP: --/-- HR: --  Sitting BP: 124/77 HR: 88  Standing BP: 119/76 HR: 99  Site: --  Mode: --  Orthostatic VS  02-07-25 @ 15:06  Lying BP: --/-- HR: --  Sitting BP: 124/77 HR: 87  Standing BP: 119/76 HR: 99  Site: --  Mode: --    Lipid Panel:

## 2025-02-08 LAB
24R-OH-CALCIDIOL SERPL-MCNC: 17.9 NG/ML — LOW (ref 30–80)
A1C WITH ESTIMATED AVERAGE GLUCOSE RESULT: 5.1 % — SIGNIFICANT CHANGE UP (ref 4–5.6)
ALBUMIN SERPL ELPH-MCNC: 4.3 G/DL — SIGNIFICANT CHANGE UP (ref 3.3–5)
ALP SERPL-CCNC: 54 U/L — SIGNIFICANT CHANGE UP (ref 40–120)
ALT FLD-CCNC: 11 U/L — SIGNIFICANT CHANGE UP (ref 4–33)
ANION GAP SERPL CALC-SCNC: 10 MMOL/L — SIGNIFICANT CHANGE UP (ref 7–14)
AST SERPL-CCNC: 17 U/L — SIGNIFICANT CHANGE UP (ref 4–32)
BASOPHILS # BLD AUTO: 0.02 K/UL — SIGNIFICANT CHANGE UP (ref 0–0.2)
BASOPHILS NFR BLD AUTO: 0.5 % — SIGNIFICANT CHANGE UP (ref 0–2)
BILIRUB SERPL-MCNC: 0.4 MG/DL — SIGNIFICANT CHANGE UP (ref 0.2–1.2)
BUN SERPL-MCNC: 10 MG/DL — SIGNIFICANT CHANGE UP (ref 7–23)
CALCIUM SERPL-MCNC: 9.9 MG/DL — SIGNIFICANT CHANGE UP (ref 8.4–10.5)
CHLORIDE SERPL-SCNC: 106 MMOL/L — SIGNIFICANT CHANGE UP (ref 98–107)
CHOLEST SERPL-MCNC: 159 MG/DL — SIGNIFICANT CHANGE UP
CO2 SERPL-SCNC: 27 MMOL/L — SIGNIFICANT CHANGE UP (ref 22–31)
CREAT SERPL-MCNC: 0.77 MG/DL — SIGNIFICANT CHANGE UP (ref 0.5–1.3)
EGFR: 100 ML/MIN/1.73M2 — SIGNIFICANT CHANGE UP
EOSINOPHIL # BLD AUTO: 0.09 K/UL — SIGNIFICANT CHANGE UP (ref 0–0.5)
EOSINOPHIL NFR BLD AUTO: 2.1 % — SIGNIFICANT CHANGE UP (ref 0–6)
ESTIMATED AVERAGE GLUCOSE: 100 — SIGNIFICANT CHANGE UP
GLUCOSE SERPL-MCNC: 70 MG/DL — SIGNIFICANT CHANGE UP (ref 70–99)
HCT VFR BLD CALC: 35 % — SIGNIFICANT CHANGE UP (ref 34.5–45)
HDLC SERPL-MCNC: 79 MG/DL — SIGNIFICANT CHANGE UP
HGB BLD-MCNC: 11.8 G/DL — SIGNIFICANT CHANGE UP (ref 11.5–15.5)
IANC: 2.5 K/UL — SIGNIFICANT CHANGE UP (ref 1.8–7.4)
IMM GRANULOCYTES NFR BLD AUTO: 0.2 % — SIGNIFICANT CHANGE UP (ref 0–0.9)
LIPID PNL WITH DIRECT LDL SERPL: 71 MG/DL — SIGNIFICANT CHANGE UP
LYMPHOCYTES # BLD AUTO: 1.26 K/UL — SIGNIFICANT CHANGE UP (ref 1–3.3)
LYMPHOCYTES # BLD AUTO: 29.4 % — SIGNIFICANT CHANGE UP (ref 13–44)
MCHC RBC-ENTMCNC: 28 PG — SIGNIFICANT CHANGE UP (ref 27–34)
MCHC RBC-ENTMCNC: 33.7 G/DL — SIGNIFICANT CHANGE UP (ref 32–36)
MCV RBC AUTO: 82.9 FL — SIGNIFICANT CHANGE UP (ref 80–100)
MONOCYTES # BLD AUTO: 0.41 K/UL — SIGNIFICANT CHANGE UP (ref 0–0.9)
MONOCYTES NFR BLD AUTO: 9.6 % — SIGNIFICANT CHANGE UP (ref 2–14)
NEUTROPHILS # BLD AUTO: 2.5 K/UL — SIGNIFICANT CHANGE UP (ref 1.8–7.4)
NEUTROPHILS NFR BLD AUTO: 58.2 % — SIGNIFICANT CHANGE UP (ref 43–77)
NON HDL CHOLESTEROL: 80 MG/DL — SIGNIFICANT CHANGE UP
NRBC # BLD AUTO: 0 K/UL — SIGNIFICANT CHANGE UP (ref 0–0)
NRBC # BLD: 0 /100 WBCS — SIGNIFICANT CHANGE UP (ref 0–0)
NRBC # FLD: 0 K/UL — SIGNIFICANT CHANGE UP (ref 0–0)
NRBC BLD-RTO: 0 /100 WBCS — SIGNIFICANT CHANGE UP (ref 0–0)
PLATELET # BLD AUTO: 387 K/UL — SIGNIFICANT CHANGE UP (ref 150–400)
POTASSIUM SERPL-MCNC: 4.3 MMOL/L — SIGNIFICANT CHANGE UP (ref 3.5–5.3)
POTASSIUM SERPL-SCNC: 4.3 MMOL/L — SIGNIFICANT CHANGE UP (ref 3.5–5.3)
PROT SERPL-MCNC: 7.4 G/DL — SIGNIFICANT CHANGE UP (ref 6–8.3)
RBC # BLD: 4.22 M/UL — SIGNIFICANT CHANGE UP (ref 3.8–5.2)
RBC # FLD: 13.9 % — SIGNIFICANT CHANGE UP (ref 10.3–14.5)
SODIUM SERPL-SCNC: 143 MMOL/L — SIGNIFICANT CHANGE UP (ref 135–145)
TRIGL SERPL-MCNC: 39 MG/DL — SIGNIFICANT CHANGE UP
TSH SERPL-MCNC: 1.35 UIU/ML — SIGNIFICANT CHANGE UP (ref 0.27–4.2)
WBC # BLD: 4.29 K/UL — SIGNIFICANT CHANGE UP (ref 3.8–10.5)
WBC # FLD AUTO: 4.29 K/UL — SIGNIFICANT CHANGE UP (ref 3.8–10.5)

## 2025-02-08 PROCEDURE — 99232 SBSQ HOSP IP/OBS MODERATE 35: CPT

## 2025-02-08 RX ADMIN — OLANZAPINE 10 MILLIGRAM(S): 10 TABLET ORAL at 20:43

## 2025-02-08 RX ADMIN — Medication 5 MILLIGRAM(S): at 00:45

## 2025-02-08 NOTE — BH INPATIENT PSYCHIATRY PROGRESS NOTE - NSBHASSESSSUMMFT_PSY_ALL_CORE
40yo F, domiciled with , children, and MIL, works in [employer] service, no PMH, PPH of depression, psychosis on Olanzapine and Zoloft, two prior psychiatric hospitalizations, admitted to the hospital voluntarily from crisis center for paranoid thoughts in the setting of non-medical adherence. On admission interview, patient is calm, cooperative, linear, but anxious and vague and appears internally preoccupied. Pt states that she has been taking antidepressants and anti-psychotic medications for years. Was prescribed Olanzapine and Zoloft, was switched to Abilify for a period time, and then switched back to Olanzapine. Pt states she was taking medications for a while and doing well, psychiatric symptoms had abated, so she self-discontinued medications. Had stopped taking her medications for months. At some point, her symptoms returned, she started hearing voices and having paranoid thoughts, feeling as though was she being followed. Patient said she feels uncomfortable expounding upon the details of her paranoid thoughts or the content of the voices. Restarted her Olanzapine about 2-3 weeks ago, but since restarting medications, paranoid thought and auditory hallucinations have not abated. Patient reached out to out-patient psychiatric who recommended voluntary admission.    Patient is calm, cooperative, linear, but anxious and vague. Appears internally preoccupied. Demonstrates good insight into need for treatment. To continue home Olanzapine. Will consider restarting antidepressant.    Olanzapine 10mg for psychosis  Melantonin PRN for insomnia  Lorazepam 0.5mg PRN for anxiety  Lorazepam PRN agitation

## 2025-02-08 NOTE — PSYCHIATRIC REHAB INITIAL EVALUATION - NSBHSIGPRIORMED_PSY_ALL_CORE
Per chart, patient has hx  of depression, psychosis on Olanzapine and Zoloft, two prior psychiatric hospitalizations./Yes (Specify)

## 2025-02-08 NOTE — BH INPATIENT PSYCHIATRY PROGRESS NOTE - PRN MEDS
MEDICATIONS  (PRN):  LORazepam     Tablet 2 milliGRAM(s) Oral every 6 hours PRN agitation  LORazepam     Tablet 0.5 milliGRAM(s) Oral every 12 hours PRN anxiety  LORazepam   Injectable 2 milliGRAM(s) IntraMuscular once PRN agitation  melatonin. 5 milliGRAM(s) Oral at bedtime PRN Insomnia

## 2025-02-08 NOTE — BH INPATIENT PSYCHIATRY PROGRESS NOTE - NSBHCHARTREVIEWVS_PSY_A_CORE FT
Vital Signs Last 24 Hrs  T(C): 36.6 (02-08-25 @ 07:45), Max: 36.6 (02-07-25 @ 15:06)  T(F): 97.8 (02-08-25 @ 07:45), Max: 97.9 (02-07-25 @ 15:06)  HR: --  BP: --  BP(mean): --  RR: --  SpO2: --    Orthostatic VS  02-08-25 @ 07:45  Lying BP: --/-- HR: --  Sitting BP: 135/75 HR: 83  Standing BP: 132/81 HR: 90  Site: --  Mode: --  Orthostatic VS  02-07-25 @ 15:27  Lying BP: --/-- HR: --  Sitting BP: 124/77 HR: 88  Standing BP: 119/76 HR: 99  Site: --  Mode: --  Orthostatic VS  02-07-25 @ 15:06  Lying BP: --/-- HR: --  Sitting BP: 124/77 HR: 87  Standing BP: 119/76 HR: 99  Site: --  Mode: --

## 2025-02-08 NOTE — BH INPATIENT PSYCHIATRY PROGRESS NOTE - CURRENT MEDICATION
MEDICATIONS  (STANDING):  OLANZapine 10 milliGRAM(s) Oral at bedtime    MEDICATIONS  (PRN):  LORazepam     Tablet 2 milliGRAM(s) Oral every 6 hours PRN agitation  LORazepam     Tablet 0.5 milliGRAM(s) Oral every 12 hours PRN anxiety  LORazepam   Injectable 2 milliGRAM(s) IntraMuscular once PRN agitation  melatonin. 5 milliGRAM(s) Oral at bedtime PRN Insomnia

## 2025-02-09 PROCEDURE — 99232 SBSQ HOSP IP/OBS MODERATE 35: CPT

## 2025-02-09 RX ADMIN — OLANZAPINE 10 MILLIGRAM(S): 10 TABLET ORAL at 20:14

## 2025-02-09 NOTE — BH INPATIENT PSYCHIATRY PROGRESS NOTE - NSBHASSESSSUMMFT_PSY_ALL_CORE
38yo F, domiciled with , children, and MIL, works in [employer] service, no PMH, PPH of depression, psychosis on Olanzapine and Zoloft, two prior psychiatric hospitalizations, admitted to the hospital voluntarily from crisis center for paranoid thoughts in the setting of non-medical adherence. On admission interview, patient is calm, cooperative, linear, but anxious and vague and appears internally preoccupied. Pt states that she has been taking antidepressants and anti-psychotic medications for years. Was prescribed Olanzapine and Zoloft, was switched to Abilify for a period time, and then switched back to Olanzapine. Pt states she was taking medications for a while and doing well, psychiatric symptoms had abated, so she self-discontinued medications. Had stopped taking her medications for months. At some point, her symptoms returned, she started hearing voices and having paranoid thoughts, feeling as though was she being followed. Patient said she feels uncomfortable expounding upon the details of her paranoid thoughts or the content of the voices. Restarted her Olanzapine about 2-3 weeks ago, but since restarting medications, paranoid thought and auditory hallucinations have not abated. Patient reached out to out-patient psychiatric who recommended voluntary admission.    Patient is calm, cooperative, linear, but anxious and vague. Appears internally preoccupied. Demonstrates good insight into need for treatment. To continue home Olanzapine. Will consider restarting antidepressant.    Olanzapine 10mg for psychosis  Melantonin PRN for insomnia  Lorazepam 0.5mg PRN for anxiety  Lorazepam PRN agitation

## 2025-02-09 NOTE — BH INPATIENT PSYCHIATRY PROGRESS NOTE - OTHER
internally preoccupied, some Thought blocking, paranoia
internally preoccupied, some Thought blocking, paranoia

## 2025-02-09 NOTE — BH INPATIENT PSYCHIATRY PROGRESS NOTE - NSBHCHARTREVIEWVS_PSY_A_CORE FT
Vital Signs Last 24 Hrs  T(C): 36.7 (02-08-25 @ 20:10), Max: 36.7 (02-08-25 @ 20:10)  T(F): 98.1 (02-08-25 @ 20:10), Max: 98.1 (02-08-25 @ 20:10)  HR: --  BP: --  BP(mean): --  RR: --  SpO2: --    Orthostatic VS  02-08-25 @ 20:10  Lying BP: --/-- HR: --  Sitting BP: 105/78 HR: 85  Standing BP: 101/77 HR: 106  Site: --  Mode: --  Orthostatic VS  02-08-25 @ 07:45  Lying BP: --/-- HR: --  Sitting BP: 135/75 HR: 83  Standing BP: 132/81 HR: 90  Site: --  Mode: --  Orthostatic VS  02-07-25 @ 15:27  Lying BP: --/-- HR: --  Sitting BP: 124/77 HR: 88  Standing BP: 119/76 HR: 99  Site: --  Mode: --  Orthostatic VS  02-07-25 @ 15:06  Lying BP: --/-- HR: --  Sitting BP: 124/77 HR: 87  Standing BP: 119/76 HR: 99  Site: --  Mode: --   Vital Signs Last 24 Hrs  T(C): 36.8 (02-09-25 @ 08:13), Max: 36.8 (02-09-25 @ 08:13)  T(F): 98.2 (02-09-25 @ 08:13), Max: 98.2 (02-09-25 @ 08:13)  HR: --  BP: --  BP(mean): --  RR: --  SpO2: --    Orthostatic VS  02-09-25 @ 08:13  Lying BP: --/-- HR: --  Sitting BP: 111/74 HR: 96  Standing BP: 108/80 HR: 116  Site: --  Mode: --  Orthostatic VS  02-08-25 @ 20:10  Lying BP: --/-- HR: --  Sitting BP: 105/78 HR: 85  Standing BP: 101/77 HR: 106  Site: --  Mode: --  Orthostatic VS  02-08-25 @ 07:45  Lying BP: --/-- HR: --  Sitting BP: 135/75 HR: 83  Standing BP: 132/81 HR: 90  Site: --  Mode: --  Orthostatic VS  02-07-25 @ 15:27  Lying BP: --/-- HR: --  Sitting BP: 124/77 HR: 88  Standing BP: 119/76 HR: 99  Site: --  Mode: --  Orthostatic VS  02-07-25 @ 15:06  Lying BP: --/-- HR: --  Sitting BP: 124/77 HR: 87  Standing BP: 119/76 HR: 99  Site: --  Mode: --

## 2025-02-10 PROCEDURE — 99232 SBSQ HOSP IP/OBS MODERATE 35: CPT | Mod: 25,GC

## 2025-02-10 PROCEDURE — 90853 GROUP PSYCHOTHERAPY: CPT

## 2025-02-10 RX ORDER — SERTRALINE 100 MG/1
50 TABLET, FILM COATED ORAL AT BEDTIME
Refills: 0 | Status: DISCONTINUED | OUTPATIENT
Start: 2025-02-10 | End: 2025-02-11

## 2025-02-10 RX ADMIN — SERTRALINE 50 MILLIGRAM(S): 100 TABLET, FILM COATED ORAL at 20:31

## 2025-02-10 RX ADMIN — OLANZAPINE 10 MILLIGRAM(S): 10 TABLET ORAL at 20:31

## 2025-02-10 NOTE — BH SOCIAL WORK INITIAL PSYCHOSOCIAL EVALUATION - OTHER PAST PSYCHIATRIC HISTORY (INCLUDE DETAILS REGARDING ONSET, COURSE OF ILLNESS, INPATIENT/OUTPATIENT TREATMENT)
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

## 2025-02-10 NOTE — BH INPATIENT PSYCHIATRY PROGRESS NOTE - MSE UNSTRUCTURED FT
Appearance: Dressed appropriately  Attitude / Behavior / relatedness: paranoid, anxious, calm, cooperative  Eye contact: fair eye contact  Motor: No abnormal movements, no psychomotor slowing or activation.  Speech: regular rate  Mood: anxious, paranoid but improving  Affect: constricted  Thought Process: linear, organized  Thought Content: med management, paranoid thoughts of being followed, refused to elaborate on thoughts  Perceptual: endorses AH improving, denies VH  Insight: fair, good insight into need for treatment  Judgment: fair  Impulse control: intact    Cognition: grossly intact  Gait: Intact.

## 2025-02-10 NOTE — BH INPATIENT PSYCHIATRY PROGRESS NOTE - NSBHASSESSSUMMFT_PSY_ALL_CORE
38yo F, domiciled with , children, and MIL, works in [employer] service, no PMH, PPH of depression, psychosis on Olanzapine and Zoloft, two prior psychiatric hospitalizations, admitted to the hospital voluntarily from crisis center for paranoid thoughts in the setting of non-medical adherence. On admission interview, patient is calm, cooperative, linear, but anxious and vague and appears internally preoccupied. Pt states that she has been taking antidepressants and anti-psychotic medications for years. Was prescribed Olanzapine and Zoloft, was switched to Abilify for a period time, and then switched back to Olanzapine. Pt states she was taking medications for a while and doing well, psychiatric symptoms had abated, so she self-discontinued medications. Had stopped taking her medications for months. At some point, her symptoms returned, she started hearing voices and having paranoid thoughts, feeling as though was she being followed. Patient said she feels uncomfortable expounding upon the details of her paranoid thoughts or the content of the voices. Restarted her Olanzapine about 2-3 weeks ago, but since restarting medications, paranoid thought and auditory hallucinations have not abated. Patient reached out to out-patient psychiatric who recommended voluntary admission.    Patient is calm, cooperative, linear, but anxious and vague. Appears internally preoccupied. Demonstrates good insight into need for treatment. To continue home Olanzapine. Will consider restarting antidepressant.    Olanzapine 10mg for psychosis qhs  Zoloft 50mg for depression qhs  Melantonin PRN for insomnia  Lorazepam 0.5mg PRN for anxiety  Lorazepam PRN agitation

## 2025-02-10 NOTE — BH SOCIAL WORK INITIAL PSYCHOSOCIAL EVALUATION - NSPTSTATEDGOAL_PSY_ALL_CORE
"I want to get back on my medications so that I can get my life back on track and do all the things that I know I'm capable of doing".

## 2025-02-10 NOTE — BH INPATIENT PSYCHIATRY PROGRESS NOTE - NSBHCHARTREVIEWVS_PSY_A_CORE FT
Vital Signs Last 24 Hrs  T(C): 36.4 (02-10-25 @ 07:55), Max: 36.8 (02-09-25 @ 20:30)  T(F): 97.5 (02-10-25 @ 07:55), Max: 98.2 (02-09-25 @ 20:30)  HR: --  BP: --  BP(mean): --  RR: --  SpO2: --    Orthostatic VS  02-10-25 @ 07:55  Lying BP: --/-- HR: --  Sitting BP: 118/80 HR: 100  Standing BP: 113/88 HR: 100  Site: --  Mode: --  Orthostatic VS  02-09-25 @ 20:30  Lying BP: --/-- HR: --  Sitting BP: 123/82 HR: 95  Standing BP: 110/82 HR: 100  Site: --  Mode: --  Orthostatic VS  02-09-25 @ 08:13  Lying BP: --/-- HR: --  Sitting BP: 111/74 HR: 96  Standing BP: 108/80 HR: 116  Site: --  Mode: --  Orthostatic VS  02-08-25 @ 20:10  Lying BP: --/-- HR: --  Sitting BP: 105/78 HR: 85  Standing BP: 101/77 HR: 106  Site: --  Mode: --

## 2025-02-10 NOTE — BH INPATIENT PSYCHIATRY PROGRESS NOTE - CURRENT MEDICATION
MEDICATIONS  (STANDING):  OLANZapine 10 milliGRAM(s) Oral at bedtime    MEDICATIONS  (PRN):  LORazepam     Tablet 2 milliGRAM(s) Oral every 6 hours PRN agitation  LORazepam     Tablet 0.5 milliGRAM(s) Oral every 12 hours PRN anxiety  LORazepam   Injectable 2 milliGRAM(s) IntraMuscular once PRN agitation  melatonin. 5 milliGRAM(s) Oral at bedtime PRN Insomnia   MEDICATIONS  (STANDING):  OLANZapine 10 milliGRAM(s) Oral at bedtime  sertraline 50 milliGRAM(s) Oral at bedtime    MEDICATIONS  (PRN):  LORazepam     Tablet 2 milliGRAM(s) Oral every 6 hours PRN agitation  LORazepam     Tablet 0.5 milliGRAM(s) Oral every 12 hours PRN anxiety  LORazepam   Injectable 2 milliGRAM(s) IntraMuscular once PRN agitation  melatonin. 5 milliGRAM(s) Oral at bedtime PRN Insomnia

## 2025-02-11 PROCEDURE — 99232 SBSQ HOSP IP/OBS MODERATE 35: CPT | Mod: GC

## 2025-02-11 RX ORDER — SERTRALINE 100 MG/1
100 TABLET, FILM COATED ORAL AT BEDTIME
Refills: 0 | Status: DISCONTINUED | OUTPATIENT
Start: 2025-02-11 | End: 2025-02-20

## 2025-02-11 RX ADMIN — SERTRALINE 100 MILLIGRAM(S): 100 TABLET, FILM COATED ORAL at 21:09

## 2025-02-11 RX ADMIN — OLANZAPINE 10 MILLIGRAM(S): 10 TABLET ORAL at 21:09

## 2025-02-11 NOTE — BH INPATIENT PSYCHIATRY PROGRESS NOTE - NSBHASSESSSUMMFT_PSY_ALL_CORE
38yo F, domiciled with , children, and MIL, works in [employer] service, no PMH, PPH of depression, psychosis on Olanzapine and Zoloft, two prior psychiatric hospitalizations, admitted to the hospital voluntarily from crisis center for paranoid thoughts in the setting of non-medical adherence. On admission interview, patient is calm, cooperative, linear, but anxious and vague and appears internally preoccupied. Pt states that she has been taking antidepressants and anti-psychotic medications for years. Was prescribed Olanzapine and Zoloft, was switched to Abilify for a period time, and then switched back to Olanzapine. Pt states she was taking medications for a while and doing well, psychiatric symptoms had abated, so she self-discontinued medications. Had stopped taking her medications for months. At some point, her symptoms returned, she started hearing voices and having paranoid thoughts, feeling as though was she being followed. Patient said she feels uncomfortable expounding upon the details of her paranoid thoughts or the content of the voices. Restarted her Olanzapine about 2-3 weeks ago, but since restarting medications, paranoid thought and auditory hallucinations have not abated. Patient reached out to out-patient psychiatric who recommended voluntary admission.    Patient is calm, cooperative, linear, but anxious and vague. Demonstrates good insight into need for treatment. Endorses resolution of psychotic symptoms. To increase Zoloft to home dose.     Olanzapine 10mg for psychosis qhs  Zoloft 100mg for depression qhs  Melantonin PRN for insomnia  Lorazepam 0.5mg PRN for anxiety  Lorazepam PRN agitation

## 2025-02-11 NOTE — BH INPATIENT PSYCHIATRY PROGRESS NOTE - NSBHCHARTREVIEWVS_PSY_A_CORE FT
Vital Signs Last 24 Hrs  T(C): 36.9 (02-11-25 @ 07:39), Max: 36.9 (02-11-25 @ 07:39)  T(F): 98.4 (02-11-25 @ 07:39), Max: 98.4 (02-11-25 @ 07:39)  HR: --  BP: --  BP(mean): --  RR: --  SpO2: --    Orthostatic VS  02-11-25 @ 07:39  Lying BP: --/-- HR: --  Sitting BP: 112/72 HR: 81  Standing BP: 105/87 HR: 97  Site: --  Mode: --  Orthostatic VS  02-10-25 @ 20:29  Lying BP: --/-- HR: --  Sitting BP: 110/71 HR: 91  Standing BP: 118/74 HR: 89  Site: --  Mode: --  Orthostatic VS  02-10-25 @ 07:55  Lying BP: --/-- HR: --  Sitting BP: 118/80 HR: 100  Standing BP: 113/88 HR: 100  Site: --  Mode: --  Orthostatic VS  02-09-25 @ 20:30  Lying BP: --/-- HR: --  Sitting BP: 123/82 HR: 95  Standing BP: 110/82 HR: 100  Site: --  Mode: --

## 2025-02-11 NOTE — BH INPATIENT PSYCHIATRY PROGRESS NOTE - MSE UNSTRUCTURED FT
Appearance: Dressed appropriately  Attitude / Behavior / relatedness: anxious, calm, cooperative  Eye contact: good eye contact  Motor: No abnormal movements, no psychomotor slowing or activation.  Speech: regular rate  Mood: anxious  Affect: constricted  Thought Process: linear, organized  Thought Content: med management,  Perceptual: endorses AH improving, denies VH  Insight: fair, good insight into need for treatment  Judgment: fair  Impulse control: intact    Cognition: grossly intact  Gait: Intact.

## 2025-02-11 NOTE — BH INPATIENT PSYCHIATRY PROGRESS NOTE - CURRENT MEDICATION
MEDICATIONS  (STANDING):  OLANZapine 10 milliGRAM(s) Oral at bedtime  sertraline 100 milliGRAM(s) Oral at bedtime    MEDICATIONS  (PRN):  LORazepam     Tablet 2 milliGRAM(s) Oral every 6 hours PRN agitation  LORazepam     Tablet 0.5 milliGRAM(s) Oral every 12 hours PRN anxiety  LORazepam   Injectable 2 milliGRAM(s) IntraMuscular once PRN agitation  melatonin. 5 milliGRAM(s) Oral at bedtime PRN Insomnia

## 2025-02-12 PROCEDURE — 99232 SBSQ HOSP IP/OBS MODERATE 35: CPT | Mod: 25,GC

## 2025-02-12 PROCEDURE — 90853 GROUP PSYCHOTHERAPY: CPT

## 2025-02-12 RX ORDER — LORAZEPAM 4 MG/ML
2 VIAL (ML) INJECTION EVERY 6 HOURS
Refills: 0 | Status: DISCONTINUED | OUTPATIENT
Start: 2025-02-12 | End: 2025-02-13

## 2025-02-12 RX ORDER — LORAZEPAM 4 MG/ML
2 VIAL (ML) INJECTION ONCE
Refills: 0 | Status: DISCONTINUED | OUTPATIENT
Start: 2025-02-12 | End: 2025-02-13

## 2025-02-12 RX ORDER — LORAZEPAM 4 MG/ML
0.5 VIAL (ML) INJECTION EVERY 12 HOURS
Refills: 0 | Status: DISCONTINUED | OUTPATIENT
Start: 2025-02-12 | End: 2025-02-13

## 2025-02-12 RX ORDER — OLANZAPINE 10 MG/1
12.5 TABLET ORAL AT BEDTIME
Refills: 0 | Status: DISCONTINUED | OUTPATIENT
Start: 2025-02-12 | End: 2025-02-20

## 2025-02-12 RX ADMIN — SERTRALINE 100 MILLIGRAM(S): 100 TABLET, FILM COATED ORAL at 21:18

## 2025-02-12 RX ADMIN — OLANZAPINE 12.5 MILLIGRAM(S): 10 TABLET ORAL at 21:18

## 2025-02-12 NOTE — BH INPATIENT PSYCHIATRY PROGRESS NOTE - NSBHASSESSSUMMFT_PSY_ALL_CORE
38yo F, domiciled with , children, and MIL, works in [employer] service, no PMH, PPH of depression, psychosis on Olanzapine and Zoloft, two prior psychiatric hospitalizations, admitted to the hospital voluntarily from crisis center for paranoid thoughts in the setting of non-medical adherence. On admission interview, patient is calm, cooperative, linear, but anxious and vague and appears internally preoccupied. Pt states that she has been taking antidepressants and anti-psychotic medications for years. Was prescribed Olanzapine and Zoloft, was switched to Abilify for a period time, and then switched back to Olanzapine. Pt states she was taking medications for a while and doing well, psychiatric symptoms had abated, so she self-discontinued medications. Had stopped taking her medications for months. At some point, her symptoms returned, she started hearing voices and having paranoid thoughts, feeling as though was she being followed. Patient said she feels uncomfortable expounding upon the details of her paranoid thoughts or the content of the voices. Restarted her Olanzapine about 2-3 weeks ago, but since restarting medications, paranoid thought and auditory hallucinations have not abated. Patient reached out to out-patient psychiatric who recommended voluntary admission.    Patient is calm, cooperative, linear, but anxious and ambiguous. Demonstrates good insight into need for treatment. Endorses resolution of psychotic symptoms. To increase Olanzapine dose     Olanzapine 12.5mg for psychosis qhs  Zoloft 100mg for depression qhs  Melantonin PRN for insomnia  Lorazepam 0.5mg PRN for anxiety  Lorazepam PRN agitation 38yo F, domiciled with , children, and MIL, works in [employer] service, no PMH, PPH of depression, psychosis on Olanzapine and Zoloft, two prior psychiatric hospitalizations, admitted to the hospital voluntarily from crisis center for paranoid thoughts in the setting of non-medical adherence. On admission interview, patient is calm, cooperative, linear, but anxious and vague and appears internally preoccupied. Pt states that she has been taking antidepressants and anti-psychotic medications for years. Was prescribed Olanzapine and Zoloft, was switched to Abilify for a period time, and then switched back to Olanzapine. Pt states she was taking medications for a while and doing well, psychiatric symptoms had abated, so she self-discontinued medications. Had stopped taking her medications for months. At some point, her symptoms returned, she started hearing voices and having paranoid thoughts, feeling as though was she being followed. Patient said she feels uncomfortable expounding upon the details of her paranoid thoughts or the content of the voices. Restarted her Olanzapine about 2-3 weeks ago, but since restarting medications, paranoid thought and auditory hallucinations have not abated. Patient reached out to out-patient psychiatric who recommended voluntary admission.    Patient is calm, cooperative, linear, but anxious and ambiguous. Demonstrates good insight into need for treatment. Endorses improvements in psychotic symptoms. To increase Olanzapine dose     Olanzapine 12.5mg for psychosis qhs  Zoloft 100mg for depression qhs  Melantonin PRN for insomnia  Lorazepam 0.5mg PRN for anxiety  Lorazepam PRN agitation

## 2025-02-12 NOTE — BH INPATIENT PSYCHIATRY PROGRESS NOTE - NSBHCHARTREVIEWVS_PSY_A_CORE FT
Vital Signs Last 24 Hrs  T(C): 36.4 (02-12-25 @ 07:56), Max: 36.4 (02-12-25 @ 07:56)  T(F): 97.5 (02-12-25 @ 07:56), Max: 97.5 (02-12-25 @ 07:56)  HR: --  BP: --  BP(mean): --  RR: --  SpO2: --    Orthostatic VS  02-12-25 @ 07:56  Lying BP: --/-- HR: --  Sitting BP: 115/75 HR: 105  Standing BP: 105/66 HR: 118  Site: --  Mode: --  Orthostatic VS  02-11-25 @ 07:39  Lying BP: --/-- HR: --  Sitting BP: 112/72 HR: 81  Standing BP: 105/87 HR: 97  Site: --  Mode: --  Orthostatic VS  02-10-25 @ 20:29  Lying BP: --/-- HR: --  Sitting BP: 110/71 HR: 91  Standing BP: 118/74 HR: 89  Site: --  Mode: --

## 2025-02-12 NOTE — BH INPATIENT PSYCHIATRY PROGRESS NOTE - MSE UNSTRUCTURED FT
Appearance: Dressed appropriately  Attitude / Behavior / relatedness: anxious, calm, cooperative  Eye contact: good eye contact  Motor: No abnormal movements, no psychomotor slowing or activation.  Speech: regular rate  Mood: anxious  Affect: constricted  Thought Process: linear, ambiguous  Thought Content: med management  Perceptual: endorses AH improving, denies VH  Insight: fair, good insight into need for treatment  Judgment: fair  Impulse control: intact    Cognition: grossly intact  Gait: Intact.

## 2025-02-12 NOTE — BH INPATIENT PSYCHIATRY PROGRESS NOTE - CURRENT MEDICATION
MEDICATIONS  (STANDING):  OLANZapine 12.5 milliGRAM(s) Oral at bedtime  sertraline 100 milliGRAM(s) Oral at bedtime    MEDICATIONS  (PRN):  LORazepam     Tablet 2 milliGRAM(s) Oral every 6 hours PRN agitation  LORazepam     Tablet 0.5 milliGRAM(s) Oral every 12 hours PRN anxiety  LORazepam   Injectable 2 milliGRAM(s) IntraMuscular once PRN agitation  melatonin. 5 milliGRAM(s) Oral at bedtime PRN Insomnia

## 2025-02-13 PROCEDURE — 99232 SBSQ HOSP IP/OBS MODERATE 35: CPT | Mod: GC

## 2025-02-13 RX ORDER — LORAZEPAM 4 MG/ML
0.5 VIAL (ML) INJECTION EVERY 12 HOURS
Refills: 0 | Status: DISCONTINUED | OUTPATIENT
Start: 2025-02-13 | End: 2025-02-19

## 2025-02-13 RX ORDER — LORAZEPAM 4 MG/ML
2 VIAL (ML) INJECTION ONCE
Refills: 0 | Status: DISCONTINUED | OUTPATIENT
Start: 2025-02-13 | End: 2025-02-19

## 2025-02-13 RX ORDER — LORAZEPAM 4 MG/ML
2 VIAL (ML) INJECTION EVERY 6 HOURS
Refills: 0 | Status: DISCONTINUED | OUTPATIENT
Start: 2025-02-13 | End: 2025-02-19

## 2025-02-13 RX ADMIN — OLANZAPINE 12.5 MILLIGRAM(S): 10 TABLET ORAL at 21:00

## 2025-02-13 RX ADMIN — SERTRALINE 100 MILLIGRAM(S): 100 TABLET, FILM COATED ORAL at 21:00

## 2025-02-13 NOTE — BH TREATMENT PLAN - NSTXPSYCHOINTERPR_PSY_ALL_CORE
Writer met with patient to orient patient to unit 2N as well as the role/function of Activities Specialists and Inpatient Psychiatric Rehabilitation programming. Patient demonstrated difficulty engaging with writer during initial session due to presenting symptoms of paranoia. Patient presented appropriately dressed and groomed. Patient demonstrated difficulty identifying an appropriate rehabilitation goal within the context of presenting symptoms. Writer identified an appropriate goal for patient defined in the behavioral health plan of care as state that they are only about 50% sure of the certainty of the delusions instead of a 100% certain. Will reassess goal with patient at later date. Psychiatric Rehabilitation staff will meet with patient weekly to review progress towards identified goal.

## 2025-02-13 NOTE — BH INPATIENT PSYCHIATRY PROGRESS NOTE - NSBHASSESSSUMMFT_PSY_ALL_CORE
38yo F, domiciled with , children, and MIL, works in [employer] service, no PMH, PPH of depression, psychosis on Olanzapine and Zoloft, two prior psychiatric hospitalizations, admitted to the hospital voluntarily from crisis center for paranoid thoughts in the setting of non-medical adherence. On admission interview, patient is calm, cooperative, linear, but anxious and vague and appears internally preoccupied. Pt states that she has been taking antidepressants and anti-psychotic medications for years. Was prescribed Olanzapine and Zoloft, was switched to Abilify for a period time, and then switched back to Olanzapine. Pt states she was taking medications for a while and doing well, psychiatric symptoms had abated, so she self-discontinued medications. Had stopped taking her medications for months. At some point, her symptoms returned, she started hearing voices and having paranoid thoughts, feeling as though was she being followed. Patient said she feels uncomfortable expounding upon the details of her paranoid thoughts or the content of the voices. Restarted her Olanzapine about 2-3 weeks ago, but since restarting medications, paranoid thought and auditory hallucinations have not abated. Patient reached out to out-patient psychiatric who recommended voluntary admission.    Patient is calm, cooperative, linear, but anxious and ambiguous. Unclear how much of her presentation is attributable to psychosis vs generalized anxiety. Endorses improvements in psychotic symptoms.    Olanzapine 12.5mg for psychosis qhs  Zoloft 100mg for depression qhs  Melantonin PRN for insomnia  Lorazepam 0.5mg PRN for anxiety  Lorazepam PRN agitation

## 2025-02-13 NOTE — BH TREATMENT PLAN - NSTXPSYCHOINTERRN_PSY_ALL_CORE
RN will provide PRN meds, safe environment, and limit stimuli to decrease agitation and help pt manage psychotic symptoms

## 2025-02-13 NOTE — BH INPATIENT PSYCHIATRY PROGRESS NOTE - NSBHCHARTREVIEWVS_PSY_A_CORE FT
Vital Signs Last 24 Hrs  T(C): --  T(F): --  HR: --  BP: --  BP(mean): --  RR: --  SpO2: --    Orthostatic VS  02-13-25 @ 08:22  Lying BP: --/-- HR: --  Sitting BP: 108/82 HR: 91  Standing BP: 97/72 HR: 114  Site: --  Mode: --  Orthostatic VS  02-12-25 @ 07:56  Lying BP: --/-- HR: --  Sitting BP: 115/75 HR: 105  Standing BP: 105/66 HR: 118  Site: --  Mode: --

## 2025-02-13 NOTE — BH TREATMENT PLAN - NSPTSTATEDGOAL_PSY_ALL_CORE
to feel better
"I want to get back on my medications so that I can get my life back on track and do all the things that I know I'm capable of doing".

## 2025-02-13 NOTE — BH TREATMENT PLAN - NSTXPATIENTPARTICIPATE_PSY_ALL_CORE
Patient participated in defining interventions
Patient participated in identification of needs/problems/goals for treatment/Patient participated in defining interventions/Patient participated in development of after care plan

## 2025-02-13 NOTE — BH TREATMENT PLAN - NSTXPSYCHOGOAL_PSY_ALL_CORE
Will be able to report experiencing hallucinations to staff
State that he/she is only about 50% sure of the certainty of the delusions instead of 100% certain

## 2025-02-13 NOTE — BH TREATMENT PLAN - NSBHPRIMARYDX_PSY_ALL_CORE
Psychosis with severe depression due to bipolar affective disorder    
Psychosis with severe depression due to bipolar affective disorder

## 2025-02-14 PROCEDURE — 99232 SBSQ HOSP IP/OBS MODERATE 35: CPT | Mod: 25,GC

## 2025-02-14 PROCEDURE — 90853 GROUP PSYCHOTHERAPY: CPT

## 2025-02-14 RX ADMIN — OLANZAPINE 12.5 MILLIGRAM(S): 10 TABLET ORAL at 20:41

## 2025-02-14 RX ADMIN — SERTRALINE 100 MILLIGRAM(S): 100 TABLET, FILM COATED ORAL at 20:41

## 2025-02-14 NOTE — BH PSYCHOLOGY - GROUP THERAPY NOTE - NSBHPSYCHOLPARTICIPCOMMENT_PSY_A_CORE FT
Pt participated when called upon, otherwise quiet
Pt participated independently 
Pt participated when called upon and offered to read, otherwise quiet

## 2025-02-14 NOTE — BH PSYCHOLOGY - GROUP THERAPY NOTE - TOKEN PULL-DIAGNOSIS
Primary Diagnosis:  Psychosis with severe depression due to bipolar affective disorder [F31.5]        Problem Dx:   

## 2025-02-14 NOTE — BH PSYCHOLOGY - GROUP THERAPY NOTE - NSPSYCHOLGRPCOGPT_PSY_A_CORE FT
Patient attended Cognitive Behavioral Therapy Group incorporating ACT-based concepts. The group started with a brief check-in asking Pts to share their favorite movie or TV show. Members then engaged in a “being kind to yourself” meditation and were encouraged to share their thoughts/reactions. Lastly, Group engaged in a discussion focused on the integration of more self-compassionate language, how to show themselves compassion during challenging times; normalizing their experiences and providing supportive feedback/validation to one another throughout. Group facilitator explained concepts, reinforced participation, and engaged patients in the discussion.
Patient attended Cognitive Behavioral Therapy Group incorporating ACT-based concepts. The group started with a brief check-in asking Pts to share something that they are currently working on. Members then engaged in progressive muscle relaxation exercise for mindfulness practice and processed experience afterwards. Group was provided with handouts about personal values across several domains – self, family, friends, society. Group discussed overlapping values vs differing values between domains and engaged in conversation about using identified values as information to guide one’s decisions. Group facilitator explained concepts, reinforced participation, and engaged patients in the discussion.  
Patient attended Cognitive Behavioral Therapy Group incorporating ACT-based concepts. The group started with a brief check-in asking Pts to share something that they are grateful for. Members then engaged in a “stay focused” image search for mindfulness practice and processed the experience afterwards. Group was introduced to the “swamp metaphor” within ACT’s “Open Up” skill and discussed feeling “stuck” in personal challenges. Pts were encouraged to identify their personal “swamp” and “mountain.” Discussed concept of mountain as personal values that serve as a catalyst for growth and perseverance through difficulties. Group members went around and shared their “mountain” that gets them through the “swamp.” Group facilitator explained concepts, reinforced participation, and engaged patients in the discussion.

## 2025-02-14 NOTE — BH INPATIENT PSYCHIATRY PROGRESS NOTE - NSBHCHARTREVIEWVS_PSY_A_CORE FT
Infant progressing. One small emesis episode noted. Voiding and stooling. Completed one nipple attempt. Mom called and updated on infant's POC.    Vital Signs Last 24 Hrs  T(C): 36.6 (02-14-25 @ 08:18), Max: 36.6 (02-14-25 @ 08:18)  T(F): 97.9 (02-14-25 @ 08:18), Max: 97.9 (02-14-25 @ 08:18)  HR: --  BP: --  BP(mean): --  RR: --  SpO2: --    Orthostatic VS  02-14-25 @ 08:18  Lying BP: --/-- HR: --  Sitting BP: 107/77 HR: 80  Standing BP: 109/77 HR: 97  Site: --  Mode: --  Orthostatic VS  02-13-25 @ 08:22  Lying BP: --/-- HR: --  Sitting BP: 108/82 HR: 91  Standing BP: 97/72 HR: 114  Site: --  Mode: --

## 2025-02-14 NOTE — BH PSYCHOLOGY - GROUP THERAPY NOTE - NSPSYCHOLGRPCOGINT_PSY_A_CORE FT
Cognitive/behavioral therapy, Acceptance and Commitment therapy, Emotion regulation/coping skills taught, Psychoed
none
Cognitive/behavioral therapy, Acceptance and Commitment therapy, Emotion regulation/coping skills taught, Psychoed
Cognitive/behavioral therapy, Acceptance and Commitment therapy, Emotion regulation/coping skills taught, Psychoed

## 2025-02-14 NOTE — BH INPATIENT PSYCHIATRY PROGRESS NOTE - NSBHASSESSSUMMFT_PSY_ALL_CORE
38yo F, domiciled with , children, and MIL, works in [employer] service, no PMH, PPH of depression, psychosis on Olanzapine and Zoloft, two prior psychiatric hospitalizations, admitted to the hospital voluntarily from crisis center for paranoid thoughts in the setting of non-medical adherence. On admission interview, patient is calm, cooperative, linear, but anxious and vague and appears internally preoccupied. Pt states that she has been taking antidepressants and anti-psychotic medications for years. Was prescribed Olanzapine and Zoloft, was switched to Abilify for a period time, and then switched back to Olanzapine. Pt states she was taking medications for a while and doing well, psychiatric symptoms had abated, so she self-discontinued medications. Had stopped taking her medications for months. At some point, her symptoms returned, she started hearing voices and having paranoid thoughts, feeling as though was she being followed. Patient said she feels uncomfortable expounding upon the details of her paranoid thoughts or the content of the voices. Restarted her Olanzapine about 2-3 weeks ago, but since restarting medications, paranoid thought and auditory hallucinations have not abated. Patient reached out to out-patient psychiatric who recommended voluntary admission.    No marked changes to patient's presentation. Patient is calm, cooperative, linear, but anxious and ambiguous. Unclear how much of her presentation is attributable to psychosis vs generalized anxiety. Endorses resolution of auditory hallucinations and paranoid thinking.    Olanzapine 12.5mg for psychosis qhs  Zoloft 100mg for depression qhs  Melantonin PRN for insomnia  Lorazepam 0.5mg PRN for anxiety  Lorazepam PRN agitation 38yo F, domiciled with , children, and MIL, works in [employer] service, no PMH, PPH of depression, psychosis on Olanzapine and Zoloft, two prior psychiatric hospitalizations, admitted to the hospital voluntarily from crisis center for paranoid thoughts in the setting of non-medical adherence. On admission interview, patient is calm, cooperative, linear, but anxious and vague and appears internally preoccupied. Pt states that she has been taking antidepressants and anti-psychotic medications for years. Was prescribed Olanzapine and Zoloft, was switched to Abilify for a period time, and then switched back to Olanzapine. Pt states she was taking medications for a while and doing well, psychiatric symptoms had abated, so she self-discontinued medications. Had stopped taking her medications for months. At some point, her symptoms returned, she started hearing voices and having paranoid thoughts, feeling as though was she being followed. Patient said she feels uncomfortable expounding upon the details of her paranoid thoughts or the content of the voices. Restarted her Olanzapine about 2-3 weeks ago, but since restarting medications, paranoid thought and auditory hallucinations have not abated. Patient reached out to out-patient psychiatric who recommended voluntary admission.    No marked changes to patient's presentation. Patient is calm, cooperative, linear, but anxious and ambiguous. Unclear how much of her presentation is attributable to psychosis vs generalized anxiety. Endorses resolution of auditory hallucinations and paranoid thinking.    Olanzapine 12.5mg for psychosis qhs  Zoloft 100mg for depression qhs  Melantonin PRN for insomnia  Lorazepam 0.5mg PRN for anxiety  Lorazepam PRN agitation  dispo: consider PHP

## 2025-02-15 RX ORDER — ACETAMINOPHEN 500 MG/5ML
650 LIQUID (ML) ORAL EVERY 6 HOURS
Refills: 0 | Status: DISCONTINUED | OUTPATIENT
Start: 2025-02-15 | End: 2025-02-20

## 2025-02-15 RX ORDER — BISMUTH SUBSALICYLATE 262 MG/1
30 TABLET, CHEWABLE ORAL EVERY 8 HOURS
Refills: 0 | Status: DISCONTINUED | OUTPATIENT
Start: 2025-02-15 | End: 2025-02-20

## 2025-02-15 RX ADMIN — OLANZAPINE 12.5 MILLIGRAM(S): 10 TABLET ORAL at 20:48

## 2025-02-15 RX ADMIN — Medication 650 MILLIGRAM(S): at 15:30

## 2025-02-15 RX ADMIN — Medication 650 MILLIGRAM(S): at 14:49

## 2025-02-15 RX ADMIN — Medication 650 MILLIGRAM(S): at 20:48

## 2025-02-15 RX ADMIN — Medication 650 MILLIGRAM(S): at 21:18

## 2025-02-15 RX ADMIN — SERTRALINE 100 MILLIGRAM(S): 100 TABLET, FILM COATED ORAL at 20:48

## 2025-02-16 LAB
HCT VFR BLD CALC: 31.9 % — LOW (ref 34.5–45)
HGB BLD-MCNC: 11.2 G/DL — LOW (ref 11.5–15.5)
MCHC RBC-ENTMCNC: 28.6 PG — SIGNIFICANT CHANGE UP (ref 27–34)
MCHC RBC-ENTMCNC: 35.1 G/DL — SIGNIFICANT CHANGE UP (ref 32–36)
MCV RBC AUTO: 81.4 FL — SIGNIFICANT CHANGE UP (ref 80–100)
NRBC # BLD AUTO: 0 K/UL — SIGNIFICANT CHANGE UP (ref 0–0)
NRBC # FLD: 0 K/UL — SIGNIFICANT CHANGE UP (ref 0–0)
NRBC BLD AUTO-RTO: 0 /100 WBCS — SIGNIFICANT CHANGE UP (ref 0–0)
PLATELET # BLD AUTO: 276 K/UL — SIGNIFICANT CHANGE UP (ref 150–400)
RBC # BLD: 3.92 M/UL — SIGNIFICANT CHANGE UP (ref 3.8–5.2)
RBC # FLD: 13.6 % — SIGNIFICANT CHANGE UP (ref 10.3–14.5)
WBC # BLD: 5.84 K/UL — SIGNIFICANT CHANGE UP (ref 3.8–10.5)
WBC # FLD AUTO: 5.84 K/UL — SIGNIFICANT CHANGE UP (ref 3.8–10.5)

## 2025-02-16 RX ADMIN — SERTRALINE 100 MILLIGRAM(S): 100 TABLET, FILM COATED ORAL at 20:48

## 2025-02-16 RX ADMIN — OLANZAPINE 12.5 MILLIGRAM(S): 10 TABLET ORAL at 20:48

## 2025-02-17 LAB — GLUCOSE BLDC GLUCOMTR-MCNC: 129 MG/DL — HIGH (ref 70–99)

## 2025-02-17 RX ADMIN — Medication 5 MILLIGRAM(S): at 20:38

## 2025-02-17 RX ADMIN — SERTRALINE 100 MILLIGRAM(S): 100 TABLET, FILM COATED ORAL at 20:37

## 2025-02-17 RX ADMIN — OLANZAPINE 12.5 MILLIGRAM(S): 10 TABLET ORAL at 20:37

## 2025-02-17 NOTE — BH CHART NOTE - NSEVENTNOTEFT_PSY_ALL_CORE
Christi called for fall. Per staff, pt had come up to nursing station to ask for a toothbrush, stated that staff then heard a thud and saw patient sitting on the floor. On interview with pt, she reports "I think I got up too fast and fainted", states that she went up to the nursing station, reports "feeling like I stumbled a little bit", and states that the next thing she remembers is the staff helping her off the floor. Staff state that in the minute between pt falling and staff assessing pt, she had already regained consciousness. BP taken at time of fall, VS with sitting BP 95/67, HR 66. Standing 104/66, HR 80. Patient denies current chest pain, shortness of breath, or edema. Denies dizziness, visual changes, confusion, nausea, or vomiting. Pt states she feels much better now, seen drinking a cup of water.       T(C): 36.6 (02-17-25 @ 07:20), Max: 36.7 (02-16-25 @ 08:36)  HR: --  BP: --  RR: --  SpO2: 99% (02-17-25 @ 07:20) (99% - 99%)    Physical Exam:  Gen: Patient sitting on chair, not in acute distress   HEENT: Normocephalic, atraumatic, EOMI.    Resp: clear to auscultation bilaterally.   CV: Radial pulses 2+ b/l, regular rate and rhythm, no murmurs.   Abd: soft, non-tender, non-distended, no guarding or rigidity.  Ext: RoM intact, no clubbing, edema, or cyanosis.   Neuro: awake, alert, grossly oriented.     Assessment:  Christi called for mechanical fall. Given low BP with +orthostatics, likely etiology of fall. Encouraged pt to inc PO intake and report back to nursing if she feels dizzy or lightheaded. Patient clinically stable with exam otherwise unremarkable.     Plan:  1. no further medical intervention necessary at this time.   2. will continue to monitor routinely.   3. d/w RN staff

## 2025-02-18 PROCEDURE — 99232 SBSQ HOSP IP/OBS MODERATE 35: CPT | Mod: GC

## 2025-02-18 RX ORDER — BISMUTH SUBSALICYLATE 262 MG/1
30 TABLET, CHEWABLE ORAL
Qty: 0 | Refills: 0 | DISCHARGE
Start: 2025-02-18

## 2025-02-18 RX ORDER — SERTRALINE 100 MG/1
1 TABLET, FILM COATED ORAL
Qty: 14 | Refills: 0
Start: 2025-02-18 | End: 2025-03-03

## 2025-02-18 RX ORDER — OLANZAPINE 10 MG/1
1 TABLET ORAL
Qty: 0 | Refills: 0
Start: 2025-02-18

## 2025-02-18 RX ORDER — OLANZAPINE 10 MG/1
1 TABLET ORAL
Qty: 14 | Refills: 0
Start: 2025-02-18 | End: 2025-03-03

## 2025-02-18 RX ADMIN — SERTRALINE 100 MILLIGRAM(S): 100 TABLET, FILM COATED ORAL at 21:17

## 2025-02-18 RX ADMIN — OLANZAPINE 12.5 MILLIGRAM(S): 10 TABLET ORAL at 21:17

## 2025-02-18 NOTE — BH INPATIENT PSYCHIATRY PROGRESS NOTE - PRN MEDS
MEDICATIONS  (PRN):  acetaminophen     Tablet .. 650 milliGRAM(s) Oral every 6 hours PRN Mild Pain (1 - 3), Moderate Pain (4 - 6)  bismuth subsalicylate Liquid 30 milliLiter(s) Oral every 8 hours PRN dyspepsia  LORazepam     Tablet 0.5 milliGRAM(s) Oral every 12 hours PRN anxiety  LORazepam     Tablet 2 milliGRAM(s) Oral every 6 hours PRN agitation  LORazepam   Injectable 2 milliGRAM(s) IntraMuscular once PRN agitation  melatonin. 5 milliGRAM(s) Oral at bedtime PRN Insomnia

## 2025-02-18 NOTE — BH INPATIENT PSYCHIATRY DISCHARGE NOTE - NSDCMRMEDTOKEN_GEN_ALL_CORE_FT
sertraline 100 mg oral tablet: 1 tab(s) orally once a day  ZyPREXA 10 mg oral tablet: 1 tab(s) orally once a day (at bedtime)   OLANZapine 2.5 mg oral tablet: 1 tab(s) orally once a day (at bedtime) Take with 10mg tablet for a total of 12.5mg nightly.  sertraline 100 mg oral tablet: 1 tab(s) orally once a day (at bedtime)  ZyPREXA 10 mg oral tablet: 1 tab(s) orally once a day (at bedtime) Take with 2.5mg pill at bedtime for total dose of 12.5

## 2025-02-18 NOTE — BH DISCHARGE NOTE NURSING/SOCIAL WORK/PSYCH REHAB - NSCORESITESY/N_GEN_A_CORE_RD
Addendum  created 12/05/24 1445 by Derick Vergara MD    Attestation recorded in Intraprocedure, Intraprocedure Attestations filed      
Yes

## 2025-02-18 NOTE — BH INPATIENT PSYCHIATRY DISCHARGE NOTE - NSDCCPCAREPLAN_GEN_ALL_CORE_FT
PRINCIPAL DISCHARGE DIAGNOSIS  Diagnosis: Psychosis with severe depression due to bipolar affective disorder  Assessment and Plan of Treatment: Olanzapine, Zoloft + Therapy

## 2025-02-18 NOTE — BH INPATIENT PSYCHIATRY PROGRESS NOTE - MSE UNSTRUCTURED FT
Appearance: Dressed appropriately  Attitude / Behavior / relatedness: anxious, calm, cooperative  Eye contact: good eye contact  Motor: No abnormal movements, no psychomotor slowing or activation.  Speech: regular rate  Mood: anxious  Affect: constricted  Thought Process: linear, ambiguous  Thought Content: med management  Perceptual: endorses AH improving, denies VH  Insight: fair  Judgment: fair  Impulse control: intact    Cognition: grossly intact  Gait: Intact Appearance: Dressed appropriately  Attitude / Behavior / relatedness: anxious, calm, cooperative  Eye contact: good eye contact  Motor: No abnormal movements, no psychomotor slowing or activation.  Speech: regular rate  Mood: anxious  Affect: constricted  Thought Process: linear, ambiguous  Thought Content: grossly benign. no suicidal ideation or hi   Perceptual: endorses AH improving, denies VH  Insight: fair  Judgment: fair  Impulse control: intact    Cognition: grossly intact  Gait: Intact

## 2025-02-18 NOTE — BH INPATIENT PSYCHIATRY PROGRESS NOTE - NSBHASSESSSUMMFT_PSY_ALL_CORE
40yo F, domiciled with , children, and MIL, works in [employer] service, no PMH, PPH of depression, psychosis on Olanzapine and Zoloft, two prior psychiatric hospitalizations, admitted to the hospital voluntarily from crisis center for paranoid thoughts in the setting of non-medical adherence. On admission interview, patient is calm, cooperative, linear, but anxious and vague and appears internally preoccupied. Pt states that she has been taking antidepressants and anti-psychotic medications for years. Was prescribed Olanzapine and Zoloft, was switched to Abilify for a period time, and then switched back to Olanzapine. Pt states she was taking medications for a while and doing well, psychiatric symptoms had abated, so she self-discontinued medications. Had stopped taking her medications for months. At some point, her symptoms returned, she started hearing voices and having paranoid thoughts, feeling as though was she being followed. Patient said she feels uncomfortable expounding upon the details of her paranoid thoughts or the content of the voices. Restarted her Olanzapine about 2-3 weeks ago, but since restarting medications, paranoid thought and auditory hallucinations have not abated. Patient reached out to out-patient psychiatric who recommended voluntary admission.    No marked changes to patient's presentation. Patient is calm, cooperative, linear, but anxious and ambiguous. Endorses resolution of auditory hallucinations and paranoid thinking.    Olanzapine 12.5mg for psychosis qhs  Zoloft 100mg for depression qhs  Melantonin PRN for insomnia  Lorazepam 0.5mg PRN for anxiety  Lorazepam PRN agitation  dispo: consider PHP

## 2025-02-18 NOTE — BH DISCHARGE NOTE NURSING/SOCIAL WORK/PSYCH REHAB - DISCHARGE INSTRUCTIONS AFTERCARE APPOINTMENTS
In order to check the location, date, or time of your aftercare appointment, please refer to your Discharge Instructions Document given to you upon leaving the hospital.  If you have lost the instructions please call [phone]

## 2025-02-18 NOTE — BH INPATIENT PSYCHIATRY DISCHARGE NOTE - MSE UNSTRUCTURED FT
Appearance: Dressed appropriately  Attitude / Behavior / relatedness: calm, cooperative  Eye contact: good eye contact  Motor: No abnormal movements, no psychomotor slowing or activation.  Speech: regular rate  Mood: good  Affect: relaxed  Thought Process: linear  Thought Content: grossly benign. no suicidal ideation or hi   Perceptual: no avh. minimal to no paranoia   Insight: fair  Judgment: fair  Impulse control: intact    Cognition: grossly intact  Gait: Intact

## 2025-02-18 NOTE — BH INPATIENT PSYCHIATRY DISCHARGE NOTE - DISCHARGE SERVICE FOR PATIENT
12/3/2019    Call Regarding ReattributionPhysical    Attempt 2    Comments: left payal Milian     on the discharge service for the patient. I have reviewed and made amendments to the documentation where necessary.

## 2025-02-18 NOTE — BH INPATIENT PSYCHIATRY DISCHARGE NOTE - ATTENDING DISCHARGE PHYSICAL EXAMINATION:
Patient is calm, cooperative, appropriately engaged, making good eye contact, affectively reactive, future oriented though contents with organized thought process. No overt psychosis. no SI or HI. insight good, judgment appropriate. Impulse control intact at this time.

## 2025-02-18 NOTE — BH INPATIENT PSYCHIATRY DISCHARGE NOTE - HPI (INCLUDE ILLNESS QUALITY, SEVERITY, DURATION, TIMING, CONTEXT, MODIFYING FACTORS, ASSOCIATED SIGNS AND SYMPTOMS)
38yo F, domiciled with , children, and MIL, works in [employer] service, no PMH, PPH of depression, psychosis on Olanzapine and Zoloft, two prior psychiatric hospitalizations, admitted to the hospital voluntarily from crisis center for paranoid thoughts in the setting of non-medical adherence.     On interview, patient is calm, cooperative, linear, but anxious and vague and appears internally preoccupied. Pt states that she has been taking antidepressants and anti-psychotic medications for years. Was prescribed Olanzapine and Zoloft, was switched to Abilify for a period time, and then switched back to Olanzapine. Pt states she was taking medications for a while and doing well, psychiatric symptoms had abated, so she self-discontinued medications. Had stopped taking her medications for months. At some point, her symptoms returned, she started hearing voices and having paranoid thoughts, feeling as though was she being followed. Patient said she feels uncomfortable expounding upon the details of her paranoid thoughts or the content of the voices. Restarted her Olanzapine about 2-3 weeks ago, but since restarting medications, paranoid thought and auditory hallucinations have not abated. Patient reached out to out-patient psychiatric who recommended voluntary admission.    Endorses poor sleep recently and decreased interest in things she used to enjoy, such as playing with her kids. Endorsed vague episode of manic symptoms in the past, where she was getting less sleep ~5hrs, but still feeling energized to get projects done. Endorsed family history of schizophrenia on both her paternal and maternal sides. Denies family history of completed suicides or psychiatric history in her immediate family. Denies use of tobacco products or recreational drugs, endorses social EtOH use.     Per outpt psych note: Pt. states that she is taking Olanzapine 10 mg consistently, but is still having paranoid thoughts in particular IOR. Pt. states she is afraid to watch videos because she feels they are talking directly to her. Pt states that her anxiety has decreased has decreased, and she is feeling less tired. NP and pt. discussed plan of action and decided that pt. would come in for a voluntary hospitalization the next day.

## 2025-02-18 NOTE — BH DISCHARGE NOTE NURSING/SOCIAL WORK/PSYCH REHAB - NSDCPRGOAL_PSY_ALL_CORE
The patient met her specified goal to state that they are only about 50% sure of the certainty of the delusions instead of a 100% certain. Progress was evidenced by improvement in organization of thought. The patient attended 9 of the Psychiatric Rehabilitation groups throughout her stay. Throughout the Psychiatric Rehabilitation groups, patient was interactive and involved. On unit, patient was appropriately groomed and dressed. Patient was mostly found in the dining room. The patient completed her safety plan prior to discharge.

## 2025-02-18 NOTE — BH DISCHARGE NOTE NURSING/SOCIAL WORK/PSYCH REHAB - NSCDUDCCRISIS_PSY_A_CORE
.  [facility]  [phone] [place] [id number] [phone]   Hours:  Monday through Friday from 9 AM to 3 PM/[facility]

## 2025-02-18 NOTE — BH INPATIENT PSYCHIATRY DISCHARGE NOTE - NSBHMETABOLIC_PSY_ALL_CORE_FT
BMI: BMI (kg/m2): 23 (02-07-25 @ 15:27)  HbA1c: A1C with Estimated Average Glucose Result: 5.1 % (02-08-25 @ 09:45)    Glucose: POCT Blood Glucose.: 129 mg/dL (02-17-25 @ 07:20)    BP: --Vital Signs Last 24 Hrs  T(C): 36.9 (02-18-25 @ 07:54), Max: 36.9 (02-18-25 @ 07:54)  T(F): 98.4 (02-18-25 @ 07:54), Max: 98.4 (02-18-25 @ 07:54)  HR: --  BP: --  BP(mean): --  RR: --  SpO2: --    Orthostatic VS  02-18-25 @ 07:54  Lying BP: --/-- HR: --  Sitting BP: 112/82 HR: 95  Standing BP: 106/76 HR: 105  Site: --  Mode: --  Orthostatic VS  02-17-25 @ 09:56  Lying BP: --/-- HR: --  Sitting BP: 118/78 HR: 89  Standing BP: 120/82 HR: 104  Site: --  Mode: --  Orthostatic VS  02-17-25 @ 07:20  Lying BP: --/-- HR: --  Sitting BP: 95/67 HR: 61  Standing BP: 104/66 HR: 80  Site: --  Mode: --    Lipid Panel: Date/Time: 02-08-25 @ 09:45  Cholesterol, Serum: 159  LDL Cholesterol Calculated: 71  HDL Cholesterol, Serum: 79  Total Cholesterol/HDL Ration Measurement: --  Triglycerides, Serum: 39

## 2025-02-18 NOTE — BH INPATIENT PSYCHIATRY DISCHARGE NOTE - HOSPITAL COURSE
Dates of Hospitalization: 2/7/2025 - 2/19/2025    On presentation the unit the patient?  Given presenting symptoms and context surrounding admit, patient was XXXX    Risk benefit of medications was discussed, discussion regarding risks included but was not limited to NMS, TD, metabolic disturbances, and conduction abnormalities. The patient was in agreement with plan to start XXX??, which was titrated to XXX    Over the course of hospitalization the patient????      On the day of dc the patient was no longer???     At the time of dc the patient and family were engaged in safety planning and denied safety concerns related to dispo, they verbalized willingness to call 911 or go to ER in the event that patient was felt to be a threat to self or others. Patient progressed well and their symptoms stabilized by time of discharge. Patient was adherent with medications and by day of discharge, did not have any active psychiatric symptoms. Patient with improved mental status exam and with improved insight judgement and impulse control. Patient able to participate in the modalities of therapy offered in this inpatient setting. They are able to participate in safe disposition planning. At present, patient is not a danger to self or others, has achieved optimal benefits from hospitalization, and thus appropriate for less restrictive level of care. Psychoed provided to patient regarding diagnosis, need for ongoing treatment including medications and therapy, to refrain from driving until they speak with their psychiatrist and are well adjusted to medications. patient counseled on side effects related to their respective medication    Risk Assessment:  Risk factors for suicidality include history of diagnosis of mood or psychotic disorder, acute depressed mood, severe anxiety, recent change in treatment. Patient never endorses suicidal ideation and denied history of suicidal ideation or behaviors. Risk factors for suicide were mitigated by pt's adherence to medication. Protective factors include adherence to medication, engagement in treatment, responsibility to family, support network of family. Patient is at chronically elevated risk for self-harm but not at acutely elevated risk. They are stable for discharge with ongoing follow up in a setting that is less restrictive. Expanded safety assessment documented in Discharge Summary. psychoed provided to pt regarding the need to continue treatment, a/e related to medications, to abstain from driving until well adjusted to medications, and to discuss with their outpatient psychiatric team should they have any concerns related to treatment and / or medications.    Discharge Diagnosis:   MDD with psychosis    Discharge Medications (the patient was provided with a 14 day supply of psychiatric medications upon dc):   sertraline 100 mg oral tablet: 1 tab(s) orally once a day  ZyPREXA 10 mg oral tablet: 1 tab(s) orally once a day (at bedtime)   Dates of Hospitalization: 2/7/2025 - 2/20/2025    On presentation the unit the patient is calm, cooperative, linear, but anxious and vague and appears internally preoccupied, endorsing active auditory hallucinations. Risk benefit of medications was discussed, discussion regarding risks included but was not limited to NMS, TD, metabolic disturbances, and conduction abnormalities. The patient was in agreement with plan to start home Zoloft 100mg and home Olanzapine which was titrated up to 12.5mg.    Over the course of hospitalization the patient, pt was med adherent and endorsed rapid resolution of auditory hallucinations. Pt endorsed resolution of paranoia but did endorse some persistent feelings of generalized anxiety related to being in the hospital and interacting with the other patients on the milieu. Patient also    At the time of dc the patient and family were engaged in safety planning and denied safety concerns related to dispo, they verbalized willingness to call 911 or go to ER in the event that patient was felt to be a threat to self or others. Patient progressed well and their symptoms stabilized by time of discharge. Patient was adherent with medications and by day of discharge, did not have any active psychiatric symptoms. Patient with improved mental status exam and with improved insight judgement and impulse control. Patient able to participate in the modalities of therapy offered in this inpatient setting. They are able to participate in safe disposition planning. At present, patient is not a danger to self or others, has achieved optimal benefits from hospitalization, and thus appropriate for less restrictive level of care. Psychoed provided to patient regarding diagnosis, need for ongoing treatment including medications and therapy, to refrain from driving until they speak with their psychiatrist and are well adjusted to medications. patient counseled on side effects related to their respective medication    Risk Assessment:  Risk factors for suicidality include history of diagnosis of mood or psychotic disorder, acute depressed mood, severe anxiety, recent change in treatment. Patient never endorses suicidal ideation and denied history of suicidal ideation or behaviors. Risk factors for suicide were mitigated by pt's adherence to medication. Protective factors include adherence to medication, engagement in treatment, responsibility to family, support network of family. Patient is at chronically elevated risk for self-harm but not at acutely elevated risk. They are stable for discharge with ongoing follow up in a setting that is less restrictive. Expanded safety assessment documented in Discharge Summary. psychoed provided to pt regarding the need to continue treatment, a/e related to medications, to abstain from driving until well adjusted to medications, and to discuss with their outpatient psychiatric team should they have any concerns related to treatment and / or medications.    Discharge Diagnosis:   MDD with psychosis    Discharge Medications (the patient was provided with a 14 day supply of psychiatric medications upon dc):   sertraline 100 mg oral tablet: 1 tab(s) orally once a day  ZyPREXA 12.5 mg oral tablet: 1 tab(s) orally once a day (at bedtime)

## 2025-02-18 NOTE — BH INPATIENT PSYCHIATRY DISCHARGE NOTE - NSBHFUPINTERVALHXFT_PSY_A_CORE
Pt seen, chart reviewed, case discussed with team. No overnight behavioral events, PRNs required, or behavioral concerns. On interview, patient is calm, cooperative. Continues to be med adherent. Endorses complete resolution of AH and paranoia. Denies SI, HI, Sib, VH. Amendable to continuing medications upon discharge.

## 2025-02-18 NOTE — BH INPATIENT PSYCHIATRY DISCHARGE NOTE - REASON FOR ADMISSION
You were admitted to the hospital voluntarily from the [facility] for worsening auditory hallucinations and paranoid feelings.

## 2025-02-18 NOTE — BH INPATIENT PSYCHIATRY PROGRESS NOTE - NSBHCHARTREVIEWVS_PSY_A_CORE FT
Vital Signs Last 24 Hrs  T(C): 36.9 (02-18-25 @ 07:54), Max: 36.9 (02-18-25 @ 07:54)  T(F): 98.4 (02-18-25 @ 07:54), Max: 98.4 (02-18-25 @ 07:54)  HR: --  BP: --  BP(mean): --  RR: --  SpO2: --    Orthostatic VS  02-18-25 @ 07:54  Lying BP: --/-- HR: --  Sitting BP: 112/82 HR: 95  Standing BP: 106/76 HR: 105  Site: --  Mode: --  Orthostatic VS  02-17-25 @ 09:56  Lying BP: --/-- HR: --  Sitting BP: 118/78 HR: 89  Standing BP: 120/82 HR: 104  Site: --  Mode: --  Orthostatic VS  02-17-25 @ 07:20  Lying BP: --/-- HR: --  Sitting BP: 95/67 HR: 61  Standing BP: 104/66 HR: 80  Site: --  Mode: --

## 2025-02-18 NOTE — BH INPATIENT PSYCHIATRY DISCHARGE NOTE - NSBHASSESSSUMMFT_PSY_ALL_CORE
40yo F, domiciled with , children, and MIL, works in [employer] service, no PMH, PPH of depression, psychosis on Olanzapine and Zoloft, two prior psychiatric hospitalizations, admitted to the hospital voluntarily from crisis center for paranoid thoughts in the setting of non-medical adherence. On admission interview, patient is calm, cooperative, linear, but anxious and vague and appears internally preoccupied. Pt states that she has been taking antidepressants and anti-psychotic medications for years. Was prescribed Olanzapine and Zoloft, was switched to Abilify for a period time, and then switched back to Olanzapine. Pt states she was taking medications for a while and doing well, psychiatric symptoms had abated, so she self-discontinued medications. Had stopped taking her medications for months. At some point, her symptoms returned, she started hearing voices and having paranoid thoughts, feeling as though was she being followed. Patient said she feels uncomfortable expounding upon the details of her paranoid thoughts or the content of the voices. Restarted her Olanzapine about 2-3 weeks ago, but since restarting medications, paranoid thought and auditory hallucinations have not abated. Patient reached out to out-patient psychiatric who recommended voluntary admission.    No marked changes to patient's presentation. Patient is calm, cooperative, linear. Endorses resolution of auditory hallucinations and paranoid thinking.    Olanzapine 12.5mg for psychosis qhs  Zoloft 100mg for depression qhs  Melantonin PRN for insomnia  Lorazepam 0.5mg PRN for anxiety  Lorazepam PRN agitation  dispo: consider PHP  40yo F, domiciled with , children, and MIL, works in [employer] service, no PMH, PPH of depression, psychosis on Olanzapine and Zoloft, two prior psychiatric hospitalizations, admitted to the hospital voluntarily from crisis center for paranoid thoughts in the setting of non-medical adherence. On admission interview, patient is calm, cooperative, linear, but anxious and vague and appears internally preoccupied. Pt states that she has been taking antidepressants and anti-psychotic medications for years. Was prescribed Olanzapine and Zoloft, was switched to Abilify for a period time, and then switched back to Olanzapine. Pt states she was taking medications for a while and doing well, psychiatric symptoms had abated, so she self-discontinued medications. Had stopped taking her medications for months. At some point, her symptoms returned, she started hearing voices and having paranoid thoughts, feeling as though was she being followed. Patient said she feels uncomfortable expounding upon the details of her paranoid thoughts or the content of the voices. Restarted her Olanzapine about 2-3 weeks ago, but since restarting medications, paranoid thought and auditory hallucinations have not abated. Patient reached out to out-patient psychiatric who recommended voluntary admission.    No marked changes to patient's presentation. Patient is calm, cooperative, linear. Endorses resolution of auditory hallucinations and paranoid thinking.    Olanzapine 12.5mg for psychosis qhs  Zoloft 100mg for depression qhs  Melantonin PRN for insomnia  Lorazepam 0.5mg PRN for anxiety  Lorazepam PRN agitation  dispo: PHP start 2/21

## 2025-02-18 NOTE — BH DISCHARGE NOTE NURSING/SOCIAL WORK/PSYCH REHAB - PATIENT PORTAL LINK FT
You can access the FollowMyHealth Patient Portal offered by [facility] by registering at the following website: [email or web address]. By joining [employer]’s FollowMyHealth portal, you will also be able to view your health information using other applications (apps) compatible with our system.

## 2025-02-18 NOTE — BH INPATIENT PSYCHIATRY PROGRESS NOTE - CURRENT MEDICATION
MEDICATIONS  (STANDING):  OLANZapine 12.5 milliGRAM(s) Oral at bedtime  sertraline 100 milliGRAM(s) Oral at bedtime    MEDICATIONS  (PRN):  acetaminophen     Tablet .. 650 milliGRAM(s) Oral every 6 hours PRN Mild Pain (1 - 3), Moderate Pain (4 - 6)  bismuth subsalicylate Liquid 30 milliLiter(s) Oral every 8 hours PRN dyspepsia  LORazepam     Tablet 0.5 milliGRAM(s) Oral every 12 hours PRN anxiety  LORazepam     Tablet 2 milliGRAM(s) Oral every 6 hours PRN agitation  LORazepam   Injectable 2 milliGRAM(s) IntraMuscular once PRN agitation  melatonin. 5 milliGRAM(s) Oral at bedtime PRN Insomnia

## 2025-02-19 PROCEDURE — 99232 SBSQ HOSP IP/OBS MODERATE 35: CPT | Mod: GC

## 2025-02-19 RX ORDER — LORAZEPAM 4 MG/ML
2 VIAL (ML) INJECTION EVERY 6 HOURS
Refills: 0 | Status: DISCONTINUED | OUTPATIENT
Start: 2025-02-19 | End: 2025-02-20

## 2025-02-19 RX ORDER — LORAZEPAM 4 MG/ML
0.5 VIAL (ML) INJECTION EVERY 12 HOURS
Refills: 0 | Status: DISCONTINUED | OUTPATIENT
Start: 2025-02-19 | End: 2025-02-20

## 2025-02-19 RX ORDER — LORAZEPAM 4 MG/ML
2 VIAL (ML) INJECTION ONCE
Refills: 0 | Status: DISCONTINUED | OUTPATIENT
Start: 2025-02-19 | End: 2025-02-20

## 2025-02-19 RX ADMIN — SERTRALINE 100 MILLIGRAM(S): 100 TABLET, FILM COATED ORAL at 20:23

## 2025-02-19 RX ADMIN — OLANZAPINE 12.5 MILLIGRAM(S): 10 TABLET ORAL at 20:22

## 2025-02-19 NOTE — BH INPATIENT PSYCHIATRY PROGRESS NOTE - CURRENT MEDICATION
MEDICATIONS  (STANDING):  OLANZapine 12.5 milliGRAM(s) Oral at bedtime  sertraline 100 milliGRAM(s) Oral at bedtime    MEDICATIONS  (PRN):  acetaminophen     Tablet .. 650 milliGRAM(s) Oral every 6 hours PRN Mild Pain (1 - 3), Moderate Pain (4 - 6)  bismuth subsalicylate Liquid 30 milliLiter(s) Oral every 8 hours PRN dyspepsia  LORazepam     Tablet 0.5 milliGRAM(s) Oral every 12 hours PRN anxiety  LORazepam     Tablet 2 milliGRAM(s) Oral every 6 hours PRN agitation  LORazepam   Injectable 2 milliGRAM(s) IntraMuscular once PRN agitation  melatonin. 5 milliGRAM(s) Oral at bedtime PRN Insomnia   MEDICATIONS  (STANDING):  OLANZapine 12.5 milliGRAM(s) Oral at bedtime  sertraline 100 milliGRAM(s) Oral at bedtime    MEDICATIONS  (PRN):  acetaminophen     Tablet .. 650 milliGRAM(s) Oral every 6 hours PRN Mild Pain (1 - 3), Moderate Pain (4 - 6)  bismuth subsalicylate Liquid 30 milliLiter(s) Oral every 8 hours PRN dyspepsia  LORazepam     Tablet 2 milliGRAM(s) Oral every 6 hours PRN agitation  LORazepam     Tablet 0.5 milliGRAM(s) Oral every 12 hours PRN anxiety  LORazepam   Injectable 2 milliGRAM(s) IntraMuscular once PRN agitation  melatonin. 5 milliGRAM(s) Oral at bedtime PRN Insomnia

## 2025-02-19 NOTE — BH INPATIENT PSYCHIATRY PROGRESS NOTE - NSBHMETABOLIC_PSY_ALL_CORE_FT
BMI: BMI (kg/m2): 23 (02-07-25 @ 15:27)  HbA1c: A1C with Estimated Average Glucose Result: 5.1 % (02-08-25 @ 09:45)    Glucose:   BP: --Vital Signs Last 24 Hrs  T(C): 36.4 (02-12-25 @ 07:56), Max: 36.4 (02-12-25 @ 07:56)  T(F): 97.5 (02-12-25 @ 07:56), Max: 97.5 (02-12-25 @ 07:56)  HR: --  BP: --  BP(mean): --  RR: --  SpO2: --    Orthostatic VS  02-12-25 @ 07:56  Lying BP: --/-- HR: --  Sitting BP: 115/75 HR: 105  Standing BP: 105/66 HR: 118  Site: --  Mode: --  Orthostatic VS  02-11-25 @ 07:39  Lying BP: --/-- HR: --  Sitting BP: 112/72 HR: 81  Standing BP: 105/87 HR: 97  Site: --  Mode: --  Orthostatic VS  02-10-25 @ 20:29  Lying BP: --/-- HR: --  Sitting BP: 110/71 HR: 91  Standing BP: 118/74 HR: 89  Site: --  Mode: --    Lipid Panel: Date/Time: 02-08-25 @ 09:45  Cholesterol, Serum: 159  LDL Cholesterol Calculated: 71  HDL Cholesterol, Serum: 79  Total Cholesterol/HDL Ration Measurement: --  Triglycerides, Serum: 39  
BMI: BMI (kg/m2): 23 (02-07-25 @ 15:27)  HbA1c: A1C with Estimated Average Glucose Result: 5.1 % (02-08-25 @ 09:45)    Glucose:   BP: --Vital Signs Last 24 Hrs  T(C): 36.7 (02-08-25 @ 20:10), Max: 36.7 (02-08-25 @ 20:10)  T(F): 98.1 (02-08-25 @ 20:10), Max: 98.1 (02-08-25 @ 20:10)  HR: --  BP: --  BP(mean): --  RR: --  SpO2: --    Orthostatic VS  02-08-25 @ 20:10  Lying BP: --/-- HR: --  Sitting BP: 105/78 HR: 85  Standing BP: 101/77 HR: 106  Site: --  Mode: --  Orthostatic VS  02-08-25 @ 07:45  Lying BP: --/-- HR: --  Sitting BP: 135/75 HR: 83  Standing BP: 132/81 HR: 90  Site: --  Mode: --  Orthostatic VS  02-07-25 @ 15:27  Lying BP: --/-- HR: --  Sitting BP: 124/77 HR: 88  Standing BP: 119/76 HR: 99  Site: --  Mode: --  Orthostatic VS  02-07-25 @ 15:06  Lying BP: --/-- HR: --  Sitting BP: 124/77 HR: 87  Standing BP: 119/76 HR: 99  Site: --  Mode: --    Lipid Panel: Date/Time: 02-08-25 @ 09:45  Cholesterol, Serum: 159  LDL Cholesterol Calculated: 71  HDL Cholesterol, Serum: 79  Total Cholesterol/HDL Ration Measurement: --  Triglycerides, Serum: 39  
BMI: BMI (kg/m2): 23 (02-07-25 @ 15:27)  HbA1c:   Glucose:   BP: --Vital Signs Last 24 Hrs  T(C): 36.6 (02-08-25 @ 07:45), Max: 36.6 (02-07-25 @ 15:06)  T(F): 97.8 (02-08-25 @ 07:45), Max: 97.9 (02-07-25 @ 15:06)  HR: --  BP: --  BP(mean): --  RR: --  SpO2: --    Orthostatic VS  02-08-25 @ 07:45  Lying BP: --/-- HR: --  Sitting BP: 135/75 HR: 83  Standing BP: 132/81 HR: 90  Site: --  Mode: --  Orthostatic VS  02-07-25 @ 15:27  Lying BP: --/-- HR: --  Sitting BP: 124/77 HR: 88  Standing BP: 119/76 HR: 99  Site: --  Mode: --  Orthostatic VS  02-07-25 @ 15:06  Lying BP: --/-- HR: --  Sitting BP: 124/77 HR: 87  Standing BP: 119/76 HR: 99  Site: --  Mode: --    Lipid Panel: 
BMI: BMI (kg/m2): 23 (02-07-25 @ 15:27)  HbA1c: A1C with Estimated Average Glucose Result: 5.1 % (02-08-25 @ 09:45)    Glucose:   BP: --Vital Signs Last 24 Hrs  T(C): --  T(F): --  HR: --  BP: --  BP(mean): --  RR: --  SpO2: --    Orthostatic VS  02-13-25 @ 08:22  Lying BP: --/-- HR: --  Sitting BP: 108/82 HR: 91  Standing BP: 97/72 HR: 114  Site: --  Mode: --  Orthostatic VS  02-12-25 @ 07:56  Lying BP: --/-- HR: --  Sitting BP: 115/75 HR: 105  Standing BP: 105/66 HR: 118  Site: --  Mode: --    Lipid Panel: Date/Time: 02-08-25 @ 09:45  Cholesterol, Serum: 159  LDL Cholesterol Calculated: 71  HDL Cholesterol, Serum: 79  Total Cholesterol/HDL Ration Measurement: --  Triglycerides, Serum: 39  
BMI: BMI (kg/m2): 23 (02-07-25 @ 15:27)  HbA1c: A1C with Estimated Average Glucose Result: 5.1 % (02-08-25 @ 09:45)    Glucose:   BP: --Vital Signs Last 24 Hrs  T(C): 36.9 (02-11-25 @ 07:39), Max: 36.9 (02-11-25 @ 07:39)  T(F): 98.4 (02-11-25 @ 07:39), Max: 98.4 (02-11-25 @ 07:39)  HR: --  BP: --  BP(mean): --  RR: --  SpO2: --    Orthostatic VS  02-11-25 @ 07:39  Lying BP: --/-- HR: --  Sitting BP: 112/72 HR: 81  Standing BP: 105/87 HR: 97  Site: --  Mode: --  Orthostatic VS  02-10-25 @ 20:29  Lying BP: --/-- HR: --  Sitting BP: 110/71 HR: 91  Standing BP: 118/74 HR: 89  Site: --  Mode: --  Orthostatic VS  02-10-25 @ 07:55  Lying BP: --/-- HR: --  Sitting BP: 118/80 HR: 100  Standing BP: 113/88 HR: 100  Site: --  Mode: --  Orthostatic VS  02-09-25 @ 20:30  Lying BP: --/-- HR: --  Sitting BP: 123/82 HR: 95  Standing BP: 110/82 HR: 100  Site: --  Mode: --    Lipid Panel: Date/Time: 02-08-25 @ 09:45  Cholesterol, Serum: 159  LDL Cholesterol Calculated: 71  HDL Cholesterol, Serum: 79  Total Cholesterol/HDL Ration Measurement: --  Triglycerides, Serum: 39  
BMI: BMI (kg/m2): 23 (02-07-25 @ 15:27)  HbA1c: A1C with Estimated Average Glucose Result: 5.1 % (02-08-25 @ 09:45)    Glucose: POCT Blood Glucose.: 129 mg/dL (02-17-25 @ 07:20)    BP: --Vital Signs Last 24 Hrs  T(C): 36.9 (02-18-25 @ 07:54), Max: 36.9 (02-18-25 @ 07:54)  T(F): 98.4 (02-18-25 @ 07:54), Max: 98.4 (02-18-25 @ 07:54)  HR: --  BP: --  BP(mean): --  RR: --  SpO2: --    Orthostatic VS  02-18-25 @ 07:54  Lying BP: --/-- HR: --  Sitting BP: 112/82 HR: 95  Standing BP: 106/76 HR: 105  Site: --  Mode: --  Orthostatic VS  02-17-25 @ 09:56  Lying BP: --/-- HR: --  Sitting BP: 118/78 HR: 89  Standing BP: 120/82 HR: 104  Site: --  Mode: --  Orthostatic VS  02-17-25 @ 07:20  Lying BP: --/-- HR: --  Sitting BP: 95/67 HR: 61  Standing BP: 104/66 HR: 80  Site: --  Mode: --    Lipid Panel: Date/Time: 02-08-25 @ 09:45  Cholesterol, Serum: 159  LDL Cholesterol Calculated: 71  HDL Cholesterol, Serum: 79  Total Cholesterol/HDL Ration Measurement: --  Triglycerides, Serum: 39  
BMI: BMI (kg/m2): 23 (02-07-25 @ 15:27)  HbA1c: A1C with Estimated Average Glucose Result: 5.1 % (02-08-25 @ 09:45)    Glucose:   BP: --Vital Signs Last 24 Hrs  T(C): 36.4 (02-10-25 @ 07:55), Max: 36.8 (02-09-25 @ 20:30)  T(F): 97.5 (02-10-25 @ 07:55), Max: 98.2 (02-09-25 @ 20:30)  HR: --  BP: --  BP(mean): --  RR: --  SpO2: --    Orthostatic VS  02-10-25 @ 07:55  Lying BP: --/-- HR: --  Sitting BP: 118/80 HR: 100  Standing BP: 113/88 HR: 100  Site: --  Mode: --  Orthostatic VS  02-09-25 @ 20:30  Lying BP: --/-- HR: --  Sitting BP: 123/82 HR: 95  Standing BP: 110/82 HR: 100  Site: --  Mode: --  Orthostatic VS  02-09-25 @ 08:13  Lying BP: --/-- HR: --  Sitting BP: 111/74 HR: 96  Standing BP: 108/80 HR: 116  Site: --  Mode: --  Orthostatic VS  02-08-25 @ 20:10  Lying BP: --/-- HR: --  Sitting BP: 105/78 HR: 85  Standing BP: 101/77 HR: 106  Site: --  Mode: --    Lipid Panel: Date/Time: 02-08-25 @ 09:45  Cholesterol, Serum: 159  LDL Cholesterol Calculated: 71  HDL Cholesterol, Serum: 79  Total Cholesterol/HDL Ration Measurement: --  Triglycerides, Serum: 39  
BMI: BMI (kg/m2): 23 (02-07-25 @ 15:27)  HbA1c: A1C with Estimated Average Glucose Result: 5.1 % (02-08-25 @ 09:45)    Glucose:   BP: --Vital Signs Last 24 Hrs  T(C): 36.6 (02-14-25 @ 08:18), Max: 36.6 (02-14-25 @ 08:18)  T(F): 97.9 (02-14-25 @ 08:18), Max: 97.9 (02-14-25 @ 08:18)  HR: --  BP: --  BP(mean): --  RR: --  SpO2: --    Orthostatic VS  02-14-25 @ 08:18  Lying BP: --/-- HR: --  Sitting BP: 107/77 HR: 80  Standing BP: 109/77 HR: 97  Site: --  Mode: --  Orthostatic VS  02-13-25 @ 08:22  Lying BP: --/-- HR: --  Sitting BP: 108/82 HR: 91  Standing BP: 97/72 HR: 114  Site: --  Mode: --    Lipid Panel: Date/Time: 02-08-25 @ 09:45  Cholesterol, Serum: 159  LDL Cholesterol Calculated: 71  HDL Cholesterol, Serum: 79  Total Cholesterol/HDL Ration Measurement: --  Triglycerides, Serum: 39  
BMI: BMI (kg/m2): 23 (02-07-25 @ 15:27)  HbA1c: A1C with Estimated Average Glucose Result: 5.1 % (02-08-25 @ 09:45)    Glucose: POCT Blood Glucose.: 129 mg/dL (02-17-25 @ 07:20)    BP: --Vital Signs Last 24 Hrs  T(C): 36.3 (02-19-25 @ 07:53), Max: 36.3 (02-19-25 @ 07:53)  T(F): 97.3 (02-19-25 @ 07:53), Max: 97.3 (02-19-25 @ 07:53)  HR: --  BP: --  BP(mean): --  RR: --  SpO2: --    Orthostatic VS  02-19-25 @ 07:53  Lying BP: --/-- HR: --  Sitting BP: 108/71 HR: 92  Standing BP: 101/68 HR: 79  Site: --  Mode: --  Orthostatic VS  02-18-25 @ 07:54  Lying BP: --/-- HR: --  Sitting BP: 112/82 HR: 95  Standing BP: 106/76 HR: 105  Site: --  Mode: --    Lipid Panel: Date/Time: 02-08-25 @ 09:45  Cholesterol, Serum: 159  LDL Cholesterol Calculated: 71  HDL Cholesterol, Serum: 79  Total Cholesterol/HDL Ration Measurement: --  Triglycerides, Serum: 39

## 2025-02-19 NOTE — BH INPATIENT PSYCHIATRY PROGRESS NOTE - NSBHATTESTTYPEVISIT_PSY_A_CORE
[facility] without on-site Attending supervision
Attending with Resident/Fellow/Student
KVNG without on-site Attending supervision
Attending with Resident/Fellow/Student

## 2025-02-19 NOTE — BH INPATIENT PSYCHIATRY PROGRESS NOTE - PRN MEDS
MEDICATIONS  (PRN):  acetaminophen     Tablet .. 650 milliGRAM(s) Oral every 6 hours PRN Mild Pain (1 - 3), Moderate Pain (4 - 6)  bismuth subsalicylate Liquid 30 milliLiter(s) Oral every 8 hours PRN dyspepsia  LORazepam     Tablet 0.5 milliGRAM(s) Oral every 12 hours PRN anxiety  LORazepam     Tablet 2 milliGRAM(s) Oral every 6 hours PRN agitation  LORazepam   Injectable 2 milliGRAM(s) IntraMuscular once PRN agitation  melatonin. 5 milliGRAM(s) Oral at bedtime PRN Insomnia   MEDICATIONS  (PRN):  acetaminophen     Tablet .. 650 milliGRAM(s) Oral every 6 hours PRN Mild Pain (1 - 3), Moderate Pain (4 - 6)  bismuth subsalicylate Liquid 30 milliLiter(s) Oral every 8 hours PRN dyspepsia  LORazepam     Tablet 2 milliGRAM(s) Oral every 6 hours PRN agitation  LORazepam     Tablet 0.5 milliGRAM(s) Oral every 12 hours PRN anxiety  LORazepam   Injectable 2 milliGRAM(s) IntraMuscular once PRN agitation  melatonin. 5 milliGRAM(s) Oral at bedtime PRN Insomnia

## 2025-02-19 NOTE — BH INPATIENT PSYCHIATRY PROGRESS NOTE - NSBHCHARTREVIEWVS_PSY_A_CORE FT
Vital Signs Last 24 Hrs  T(C): 36.3 (02-19-25 @ 07:53), Max: 36.3 (02-19-25 @ 07:53)  T(F): 97.3 (02-19-25 @ 07:53), Max: 97.3 (02-19-25 @ 07:53)  HR: --  BP: --  BP(mean): --  RR: --  SpO2: --    Orthostatic VS  02-19-25 @ 07:53  Lying BP: --/-- HR: --  Sitting BP: 108/71 HR: 92  Standing BP: 101/68 HR: 79  Site: --  Mode: --  Orthostatic VS  02-18-25 @ 07:54  Lying BP: --/-- HR: --  Sitting BP: 112/82 HR: 95  Standing BP: 106/76 HR: 105  Site: --  Mode: --

## 2025-02-19 NOTE — BH INPATIENT PSYCHIATRY PROGRESS NOTE - NSBHATTESTCOMMENTATTENDFT_PSY_A_CORE
Meaningful reflection of psychosis. no a/e to medication. attending groups. Feels AH is resolved. Talks of past IOR and states she is trying to better utilize reality testing. She expresses interest in starting PHP 
Pt reporting fragmented sleep due to obs checks, continues to exhibit psychotic depression, will add sertraline, continue olanzapine as ordered.
Subjective improvements in psychosis though still feels intermittently paranoid. offered increase in Olanzapine and pt accepts. 
Pt anxious, reports improvements in psychosis. Tolerating Olanzapine 
Subjective improvements in psychosis. some paranoia. AH that fluctuates. No a/e to Sertraline 
Attenuated psychosis. she feels ready for discharge. Agreeable to PHP 
Subjective improvements in psychosis. Pt slated for discharge tomorrow to begin PHP Friday.

## 2025-02-19 NOTE — BH INPATIENT PSYCHIATRY PROGRESS NOTE - MSE UNSTRUCTURED FT
Appearance: Dressed appropriately  Attitude / Behavior / relatedness: anxious, calm, cooperative  Eye contact: good eye contact  Motor: No abnormal movements, no psychomotor slowing or activation.  Speech: regular rate  Mood: anxious  Affect: constricted  Thought Process: linear, ambiguous  Thought Content: grossly benign. no suicidal ideation or hi   Perceptual: endorses AH improving, denies VH  Insight: fair  Judgment: fair  Impulse control: intact    Cognition: grossly intact  Gait: Intact Appearance: Dressed appropriately  Attitude / Behavior / relatedness: calm, cooperative  Eye contact: good eye contact  Motor: No abnormal movements, no psychomotor slowing or activation.  Speech: regular rate  Mood: good  Affect: relaxed  Thought Process: linear  Thought Content: grossly benign. no suicidal ideation or hi   Perceptual: no avh. minimal to no paranoia   Insight: fair  Judgment: fair  Impulse control: intact    Cognition: grossly intact  Gait: Intact

## 2025-02-19 NOTE — BH INPATIENT PSYCHIATRY PROGRESS NOTE - NSICDXBHPRIMARYDX_PSY_ALL_CORE
Psychosis with severe depression due to bipolar affective disorder   F31.5  

## 2025-02-19 NOTE — BH INPATIENT PSYCHIATRY PROGRESS NOTE - NSBHFUPINTERVALHXFT_PSY_A_CORE
Pt seen, chart reviewed, case discussed with team. No overnights, PRNs required, or behavioral concerns. On interview, patient is calm, cooperative, appears anxious. Continues to be med adherent. Endorses resolution of AH and paranoia. Slept well through the night. Denies SI, HI, Sib, VH. Endorses some persistent generalized anxiety. Pt restated details surrounding hosptial admission: that she had self-discontinued medication for two months, started having feelings of paranoid and was restarted on Abilify for 2-3 weeks, then was switched to Olanzapine. Despite consistent med adherence as outpatient, pt still developed auditory hallucinations; believes it's because she didn't have enough medications in her system yet. Pt inquired about etiology and inheritance of schizophrenia and psychosis. Inquired about discharge planning.
Pt seen, chart reviewed, case discussed with team. No overnight behavioral events, PRNs required, or behavioral concerns. On interview, patient is calm, cooperative. Continues to be med adherent. Endorses resolution of AH and paranoia. Slept well through the night. Denies SI, HI, Sib, VH. Endorses an event yesterday where the patient "fainted". States she doesn't have clear recollection of her fall, denies hitting her head. States she believes she just got up from bed too quickly. Hasn't had any subsequent episodes of dizziness or falling. Inquired about discharge planning.
Pt seen, chart reviewed, case discussed with team. No overnights, PRNs required, or behavioral concerns. On interview, patient is calm, but ambiguous, and appears anxious. Endorses resolution of AH. Slept well through the night. Denies SI, HI, Sib, VH. Endorses some generalized anxiety, but endorses overall improvement of paranoid feelings. Endorses episodes of head "pressure" that lasts a 1-2 minutes, states sensation is different than a headache, unsure of what precipitates sensation or what mood, anxiety, or psychosic symptoms that sensation is attributable to.
Pt seen, chart reviewed, case discussed with team. No overnights, PRNs required, or behavioral concerns. On interview, patient is calm, but ambiguous, and appears anxious. Endorses resolution of AH. Slept well through the night. Denies SI, HI, Sib, VH. Endorses some generalized anxiety, in particular when people walk up to her while she's on the phone. Endorses one episode of head "pressure" yesterday that lasted a few seconds; wasn't long enough for her to get to milieu staff.
Pt seen, chart reviewed, case discussed with team. No overnights, PRNs required, or behavioral concerns. On interview, patient is calm, cooperative, forthcoming, but appears anxious. Good insight into need for treatment. Endorse mild subjective improvements to mood and auditory hallucinations since coming to the hospital; AH just "sound like whispers", that usually only occur when the patient is feeling tired, or at night if the patient wakes up from sleep. Denies AH during the day. Denies command AH. Denies SI, HI, Sib, VH. Endorses improvement to fear of electronics and watching TV; states she has been able to watch TV on the milieu more comfortably. Endorses persistent feelings of being followed, which have improved mildly since hospital admission. Pt asked whether she would be starting Zoloft with her standing Olanzapine. 
Pt seen, chart reviewed, case discussed with team. No overnights, PRNs required, or behavioral concerns. On interview, patient is calm, cooperative, forthcoming, but appears anxious. Good insight into need for treatment. Endorses resolution of AH. Slept well through the night. Denies SI, HI, Sib, VH. Endorses some social anxiety related to being on th unit and interacting with other patients, but endorses overall improvement of paranoid feelings. 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 Denies ideas of reference.
Pt seen, chart reviewed, case discussed with team. No overnight behavioral events, PRNs required, or behavioral concerns. On interview, patient is calm, cooperative. Continues to be med adherent. Endorses complete resolution of AH and paranoia. Denies SI, HI, Sib, VH. Requests to have hospital letter for work or [facility] paperwork filled out. 
Patient is seen for depression and psychosis. Patient is discussed with nursing team. Per nursing no interval events.  Remains compliant with medications, no SE reported. Eating and sleeping well. No behavioral issues on unit, no prns for aggression.        Patient is seen in dayroom. Patient is calm and cooperative upon approach. She reports doing ok so far this morning but reports poor sleep due to roommates snoring.  She denies SI/HI, no plan or intent and engages in safety plan.  Patient reports that she was admitted due to self-discontinuing medications and her symptoms resurfaced “I was admitted for paranoid thoughts.” Reports she started hearing voices and having paranoid thoughts, feeling as though was she being followed.  On unit patient denies VH, decreased paranoia (reports feeling safe on unit).  When asked about AH, pt replies “I don’t hear them completely but their still there”  Patient denies any pain or discomfort. No acute medical concerns. Continue to monitor and provide therapeutic support.     
Patient is seen for depression and psychosis. Patient is discussed with nursing team. Per nursing no interval events.  Remains compliant with medications, no SE reported. Eating and sleeping well. No behavioral issues on unit, no prns for aggression.  Admission labs WNL, will repeat UA.      Patient is seen in her room. Patient is calm and cooperative upon approach. She reports doing  “ok”  She denies SI/HI, no plan or intent and engages in safety plan.  Reports voices and feeling paranoid.  When asked if symptoms have decreased pt replies “I don’t know”  Patient denies VH,  Endorses AH, pt replies “I don’t hear them completely” states she does not know what voices are saying.   Patient denies any pain or discomfort. No acute medical concerns. Continue to monitor and provide therapeutic support.

## 2025-02-19 NOTE — BH INPATIENT PSYCHIATRY PROGRESS NOTE - NSBHFUPINTERVALCCFT_PSY_A_CORE
psychosis
mdd + psychosis
psychosis
mdd + psychosis

## 2025-02-19 NOTE — BH INPATIENT PSYCHIATRY PROGRESS NOTE - MSE OPTIONS
Unstructured MSE
Structured MSE
Structured MSE
Unstructured MSE
Unstructured MSE

## 2025-02-19 NOTE — BH INPATIENT PSYCHIATRY PROGRESS NOTE - NSTXPSYCHOINTERMD_PSY_ALL_CORE
medications + therapy 

## 2025-02-19 NOTE — BH INPATIENT PSYCHIATRY PROGRESS NOTE - NSBHASSESSSUMMFT_PSY_ALL_CORE
40yo F, domiciled with , children, and MIL, works in [employer] service, no PMH, PPH of depression, psychosis on Olanzapine and Zoloft, two prior psychiatric hospitalizations, admitted to the hospital voluntarily from crisis center for paranoid thoughts in the setting of non-medical adherence. On admission interview, patient is calm, cooperative, linear, but anxious and vague and appears internally preoccupied. Pt states that she has been taking antidepressants and anti-psychotic medications for years. Was prescribed Olanzapine and Zoloft, was switched to Abilify for a period time, and then switched back to Olanzapine. Pt states she was taking medications for a while and doing well, psychiatric symptoms had abated, so she self-discontinued medications. Had stopped taking her medications for months. At some point, her symptoms returned, she started hearing voices and having paranoid thoughts, feeling as though was she being followed. Patient said she feels uncomfortable expounding upon the details of her paranoid thoughts or the content of the voices. Restarted her Olanzapine about 2-3 weeks ago, but since restarting medications, paranoid thought and auditory hallucinations have not abated. Patient reached out to out-patient psychiatric who recommended voluntary admission.    No marked changes to patient's presentation. Patient is calm, cooperative, linear, but anxious and ambiguous. Endorses resolution of auditory hallucinations and paranoid thinking.    Olanzapine 12.5mg for psychosis qhs  Zoloft 100mg for depression qhs  Melantonin PRN for insomnia  Lorazepam 0.5mg PRN for anxiety  Lorazepam PRN agitation  dispo: consider PHP  40yo F, domiciled with , children, and MIL, works in [employer] service, no PMH, PPH of depression, psychosis on Olanzapine and Zoloft, two prior psychiatric hospitalizations, admitted to the hospital voluntarily from crisis center for paranoid thoughts in the setting of non-medical adherence. On admission interview, patient is calm, cooperative, linear, but anxious and vague and appears internally preoccupied. Pt states that she has been taking antidepressants and anti-psychotic medications for years. Was prescribed Olanzapine and Zoloft, was switched to Abilify for a period time, and then switched back to Olanzapine. Pt states she was taking medications for a while and doing well, psychiatric symptoms had abated, so she self-discontinued medications. Had stopped taking her medications for months. At some point, her symptoms returned, she started hearing voices and having paranoid thoughts, feeling as though was she being followed. Patient said she feels uncomfortable expounding upon the details of her paranoid thoughts or the content of the voices. Restarted her Olanzapine about 2-3 weeks ago, but since restarting medications, paranoid thought and auditory hallucinations have not abated. Patient reached out to out-patient psychiatric who recommended voluntary admission.    No marked changes to patient's presentation. Patient is calm, cooperative, linear. Endorses resolution of auditory hallucinations and paranoid thinking.    Olanzapine 12.5mg for psychosis qhs  Zoloft 100mg for depression qhs  Melantonin PRN for insomnia  Lorazepam 0.5mg PRN for anxiety  Lorazepam PRN agitation  dispo: consider PHP

## 2025-02-19 NOTE — BH INPATIENT PSYCHIATRY PROGRESS NOTE - NSTXPSYCHODATETRGT_PSY_ALL_CORE
15-Feb-2025
22-Feb-2025
22-Feb-2025
15-Feb-2025
20-Feb-2025
20-Feb-2025
15-Feb-2025
14-Feb-2025
15-Feb-2025

## 2025-02-19 NOTE — BH INPATIENT PSYCHIATRY PROGRESS NOTE - NSTXPSYCHODATEEST_PSY_ALL_CORE
08-Feb-2025
07-Feb-2025
08-Feb-2025

## 2025-02-19 NOTE — BH INPATIENT PSYCHIATRY PROGRESS NOTE - NSBHATTESTBILLING_PSY_A_CORE
[id number]-Xnvetgycuu OBS or IP - moderate complexity OR 35-49 mins
[id number]-Fldtxtgwpi OBS or IP - moderate complexity OR 35-49 mins
[id number]-Anvcnrtzer OBS or IP - moderate complexity OR 35-49 mins
[id number]-Mxeejhuyiq OBS or IP - moderate complexity OR 35-49 mins
[id number]-Ohqrfgfuag OBS or IP - moderate complexity OR 35-49 mins
[id number]-Juikyfuqpj OBS or IP - moderate complexity OR 35-49 mins
[id number]-Nbolonulwi OBS or IP - moderate complexity OR 35-49 mins
[id number]-Cbrdttagnt OBS or IP - moderate complexity OR 35-49 mins
[id number]-Hpacnxvfja OBS or IP - moderate complexity OR 35-49 mins

## 2025-02-19 NOTE — BH INPATIENT PSYCHIATRY PROGRESS NOTE - NSTXPSYCHOGOAL_PSY_ALL_CORE
State that he/she is only about 50% sure of the certainty of the delusions instead of 100% certain
Will be able to report experiencing hallucinations to staff
State that he/she is only about 50% sure of the certainty of the delusions instead of 100% certain

## 2025-02-20 VITALS — TEMPERATURE: 99 F

## 2025-02-20 RX ORDER — OLANZAPINE 10 MG/1
1 TABLET ORAL
Qty: 14 | Refills: 0
Start: 2025-02-20 | End: 2025-03-05

## 2025-02-20 RX ORDER — SERTRALINE 100 MG/1
1 TABLET, FILM COATED ORAL
Qty: 0 | Refills: 0 | DISCHARGE
Start: 2025-02-20

## 2025-02-20 RX ORDER — SERTRALINE 100 MG/1
1 TABLET, FILM COATED ORAL
Qty: 14 | Refills: 0
Start: 2025-02-20 | End: 2025-03-05

## 2025-02-21 ENCOUNTER — OUTPATIENT (OUTPATIENT)
Dept: OUTPATIENT SERVICES | Facility: HOSPITAL | Age: 40
LOS: 1 days | Discharge: HOME | End: 2025-02-21
Payer: COMMERCIAL

## 2025-02-21 PROCEDURE — 90792 PSYCH DIAG EVAL W/MED SRVCS: CPT

## 2025-02-24 DIAGNOSIS — F20.9 SCHIZOPHRENIA, UNSPECIFIED: ICD-10-CM

## 2025-02-28 PROCEDURE — 99214 OFFICE O/P EST MOD 30 MIN: CPT

## 2025-03-06 PROCEDURE — 99214 OFFICE O/P EST MOD 30 MIN: CPT

## 2025-03-11 PROCEDURE — 99214 OFFICE O/P EST MOD 30 MIN: CPT

## 2025-05-15 NOTE — OB RN TRIAGE NOTE - NSOBDISCHARGEVS_OBGYN_ALL_OB
called patient to schedule pt follow ups, patient requested transfer to the [facility] location to continue    Confirmed that patient received an additional set of vital signs prior to discharge.

## 2025-06-23 NOTE — OB NEONATOLOGY/PEDIATRICIAN DELIVERY SUMMARY - BABY A: DATE/TIME OF DELIVERY
History of mild lymphedema.  Dr. Rasheed advised to reduce sodium intake at last visit.  No current indication for diuretics. Uses compression stockings. States overall is improved over previous.        26-Jun-2020 17:35
